# Patient Record
Sex: MALE | Race: BLACK OR AFRICAN AMERICAN | NOT HISPANIC OR LATINO
[De-identification: names, ages, dates, MRNs, and addresses within clinical notes are randomized per-mention and may not be internally consistent; named-entity substitution may affect disease eponyms.]

---

## 2020-09-22 ENCOUNTER — APPOINTMENT (OUTPATIENT)
Dept: COLORECTAL SURGERY | Facility: CLINIC | Age: 50
End: 2020-09-22

## 2021-04-02 ENCOUNTER — INPATIENT (INPATIENT)
Facility: HOSPITAL | Age: 51
LOS: 1 days | Discharge: HOME | DRG: 309 | End: 2021-04-04
Attending: STUDENT IN AN ORGANIZED HEALTH CARE EDUCATION/TRAINING PROGRAM | Admitting: STUDENT IN AN ORGANIZED HEALTH CARE EDUCATION/TRAINING PROGRAM
Payer: COMMERCIAL

## 2021-04-02 VITALS
HEIGHT: 69 IN | OXYGEN SATURATION: 100 % | SYSTOLIC BLOOD PRESSURE: 139 MMHG | WEIGHT: 205.03 LBS | TEMPERATURE: 98 F | RESPIRATION RATE: 18 BRPM | DIASTOLIC BLOOD PRESSURE: 89 MMHG | HEART RATE: 85 BPM

## 2021-04-02 DIAGNOSIS — Z93.3 COLOSTOMY STATUS: Chronic | ICD-10-CM

## 2021-04-02 DIAGNOSIS — Z98.890 OTHER SPECIFIED POSTPROCEDURAL STATES: Chronic | ICD-10-CM

## 2021-04-02 DIAGNOSIS — I48.91 UNSPECIFIED ATRIAL FIBRILLATION: ICD-10-CM

## 2021-04-02 LAB
ALBUMIN SERPL ELPH-MCNC: 3.6 G/DL — SIGNIFICANT CHANGE UP (ref 3.3–5)
ALP SERPL-CCNC: 99 U/L — SIGNIFICANT CHANGE UP (ref 40–120)
ALT FLD-CCNC: 68 U/L — SIGNIFICANT CHANGE UP (ref 12–78)
ANION GAP SERPL CALC-SCNC: 4 MMOL/L — LOW (ref 5–17)
APTT BLD: 32.7 SEC — SIGNIFICANT CHANGE UP (ref 27.5–35.5)
AST SERPL-CCNC: 37 U/L — SIGNIFICANT CHANGE UP (ref 15–37)
BASOPHILS # BLD AUTO: 0.04 K/UL — SIGNIFICANT CHANGE UP (ref 0–0.2)
BASOPHILS NFR BLD AUTO: 0.7 % — SIGNIFICANT CHANGE UP (ref 0–2)
BILIRUB SERPL-MCNC: 0.8 MG/DL — SIGNIFICANT CHANGE UP (ref 0.2–1.2)
BUN SERPL-MCNC: 14 MG/DL — SIGNIFICANT CHANGE UP (ref 7–23)
CALCIUM SERPL-MCNC: 8.7 MG/DL — SIGNIFICANT CHANGE UP (ref 8.5–10.1)
CHLORIDE SERPL-SCNC: 112 MMOL/L — HIGH (ref 96–108)
CO2 SERPL-SCNC: 26 MMOL/L — SIGNIFICANT CHANGE UP (ref 22–31)
CREAT SERPL-MCNC: 1.19 MG/DL — SIGNIFICANT CHANGE UP (ref 0.5–1.3)
EOSINOPHIL # BLD AUTO: 0.19 K/UL — SIGNIFICANT CHANGE UP (ref 0–0.5)
EOSINOPHIL NFR BLD AUTO: 3.1 % — SIGNIFICANT CHANGE UP (ref 0–6)
GLUCOSE SERPL-MCNC: 106 MG/DL — HIGH (ref 70–99)
HCT VFR BLD CALC: 54.1 % — HIGH (ref 39–50)
HGB BLD-MCNC: 17.8 G/DL — HIGH (ref 13–17)
IMM GRANULOCYTES NFR BLD AUTO: 0.3 % — SIGNIFICANT CHANGE UP (ref 0–1.5)
INR BLD: 1.01 RATIO — SIGNIFICANT CHANGE UP (ref 0.88–1.16)
LYMPHOCYTES # BLD AUTO: 2 K/UL — SIGNIFICANT CHANGE UP (ref 1–3.3)
LYMPHOCYTES # BLD AUTO: 33.1 % — SIGNIFICANT CHANGE UP (ref 13–44)
MAGNESIUM SERPL-MCNC: 2.1 MG/DL — SIGNIFICANT CHANGE UP (ref 1.6–2.6)
MCHC RBC-ENTMCNC: 29.6 PG — SIGNIFICANT CHANGE UP (ref 27–34)
MCHC RBC-ENTMCNC: 32.9 GM/DL — SIGNIFICANT CHANGE UP (ref 32–36)
MCV RBC AUTO: 90 FL — SIGNIFICANT CHANGE UP (ref 80–100)
MONOCYTES # BLD AUTO: 0.45 K/UL — SIGNIFICANT CHANGE UP (ref 0–0.9)
MONOCYTES NFR BLD AUTO: 7.5 % — SIGNIFICANT CHANGE UP (ref 2–14)
NEUTROPHILS # BLD AUTO: 3.34 K/UL — SIGNIFICANT CHANGE UP (ref 1.8–7.4)
NEUTROPHILS NFR BLD AUTO: 55.3 % — SIGNIFICANT CHANGE UP (ref 43–77)
NT-PROBNP SERPL-SCNC: 871 PG/ML — HIGH (ref 0–125)
PLATELET # BLD AUTO: 305 K/UL — SIGNIFICANT CHANGE UP (ref 150–400)
POTASSIUM SERPL-MCNC: 3.8 MMOL/L — SIGNIFICANT CHANGE UP (ref 3.5–5.3)
POTASSIUM SERPL-SCNC: 3.8 MMOL/L — SIGNIFICANT CHANGE UP (ref 3.5–5.3)
PROT SERPL-MCNC: 7 GM/DL — SIGNIFICANT CHANGE UP (ref 6–8.3)
PROTHROM AB SERPL-ACNC: 11.7 SEC — SIGNIFICANT CHANGE UP (ref 10.6–13.6)
RBC # BLD: 6.01 M/UL — HIGH (ref 4.2–5.8)
RBC # FLD: 13.6 % — SIGNIFICANT CHANGE UP (ref 10.3–14.5)
SARS-COV-2 RNA SPEC QL NAA+PROBE: SIGNIFICANT CHANGE UP
SODIUM SERPL-SCNC: 142 MMOL/L — SIGNIFICANT CHANGE UP (ref 135–145)
TROPONIN I SERPL-MCNC: <0.015 NG/ML — SIGNIFICANT CHANGE UP (ref 0.01–0.04)
TROPONIN I SERPL-MCNC: <0.015 NG/ML — SIGNIFICANT CHANGE UP (ref 0.01–0.04)
WBC # BLD: 6.04 K/UL — SIGNIFICANT CHANGE UP (ref 3.8–10.5)
WBC # FLD AUTO: 6.04 K/UL — SIGNIFICANT CHANGE UP (ref 3.8–10.5)

## 2021-04-02 PROCEDURE — 93306 TTE W/DOPPLER COMPLETE: CPT

## 2021-04-02 PROCEDURE — 93005 ELECTROCARDIOGRAM TRACING: CPT

## 2021-04-02 PROCEDURE — 80053 COMPREHEN METABOLIC PANEL: CPT

## 2021-04-02 PROCEDURE — 84484 ASSAY OF TROPONIN QUANT: CPT

## 2021-04-02 PROCEDURE — 82550 ASSAY OF CK (CPK): CPT

## 2021-04-02 PROCEDURE — 93010 ELECTROCARDIOGRAM REPORT: CPT | Mod: 76

## 2021-04-02 PROCEDURE — 80048 BASIC METABOLIC PNL TOTAL CA: CPT

## 2021-04-02 PROCEDURE — 36415 COLL VENOUS BLD VENIPUNCTURE: CPT

## 2021-04-02 PROCEDURE — 99223 1ST HOSP IP/OBS HIGH 75: CPT | Mod: AI

## 2021-04-02 PROCEDURE — 86769 SARS-COV-2 COVID-19 ANTIBODY: CPT

## 2021-04-02 PROCEDURE — 85027 COMPLETE CBC AUTOMATED: CPT

## 2021-04-02 PROCEDURE — 99291 CRITICAL CARE FIRST HOUR: CPT

## 2021-04-02 PROCEDURE — 84100 ASSAY OF PHOSPHORUS: CPT

## 2021-04-02 PROCEDURE — 83735 ASSAY OF MAGNESIUM: CPT

## 2021-04-02 PROCEDURE — 81001 URINALYSIS AUTO W/SCOPE: CPT

## 2021-04-02 PROCEDURE — 71045 X-RAY EXAM CHEST 1 VIEW: CPT | Mod: 26

## 2021-04-02 RX ORDER — FOLIC ACID 0.8 MG
1 TABLET ORAL DAILY
Refills: 0 | Status: DISCONTINUED | OUTPATIENT
Start: 2021-04-02 | End: 2021-04-04

## 2021-04-02 RX ORDER — DILTIAZEM HCL 120 MG
10 CAPSULE, EXT RELEASE 24 HR ORAL ONCE
Refills: 0 | Status: COMPLETED | OUTPATIENT
Start: 2021-04-02 | End: 2021-04-02

## 2021-04-02 RX ORDER — ASPIRIN/CALCIUM CARB/MAGNESIUM 324 MG
81 TABLET ORAL DAILY
Refills: 0 | Status: DISCONTINUED | OUTPATIENT
Start: 2021-04-02 | End: 2021-04-04

## 2021-04-02 RX ORDER — SODIUM CHLORIDE 9 MG/ML
1000 INJECTION INTRAMUSCULAR; INTRAVENOUS; SUBCUTANEOUS
Refills: 0 | Status: DISCONTINUED | OUTPATIENT
Start: 2021-04-02 | End: 2021-04-03

## 2021-04-02 RX ORDER — DILTIAZEM HCL 120 MG
60 CAPSULE, EXT RELEASE 24 HR ORAL
Refills: 0 | Status: DISCONTINUED | OUTPATIENT
Start: 2021-04-02 | End: 2021-04-03

## 2021-04-02 RX ORDER — VITAMIN E 100 UNIT
1 CAPSULE ORAL
Qty: 0 | Refills: 0 | DISCHARGE

## 2021-04-02 RX ORDER — DILTIAZEM HCL 120 MG
90 CAPSULE, EXT RELEASE 24 HR ORAL ONCE
Refills: 0 | Status: COMPLETED | OUTPATIENT
Start: 2021-04-02 | End: 2021-04-02

## 2021-04-02 RX ORDER — THIAMINE MONONITRATE (VIT B1) 100 MG
100 TABLET ORAL DAILY
Refills: 0 | Status: DISCONTINUED | OUTPATIENT
Start: 2021-04-02 | End: 2021-04-04

## 2021-04-02 RX ORDER — ACETAMINOPHEN 500 MG
650 TABLET ORAL EVERY 6 HOURS
Refills: 0 | Status: DISCONTINUED | OUTPATIENT
Start: 2021-04-02 | End: 2021-04-04

## 2021-04-02 RX ORDER — SODIUM CHLORIDE 9 MG/ML
2000 INJECTION INTRAMUSCULAR; INTRAVENOUS; SUBCUTANEOUS ONCE
Refills: 0 | Status: COMPLETED | OUTPATIENT
Start: 2021-04-02 | End: 2021-04-02

## 2021-04-02 RX ORDER — ASPIRIN/CALCIUM CARB/MAGNESIUM 324 MG
325 TABLET ORAL ONCE
Refills: 0 | Status: COMPLETED | OUTPATIENT
Start: 2021-04-02 | End: 2021-04-02

## 2021-04-02 RX ORDER — ASCORBIC ACID 60 MG
1 TABLET,CHEWABLE ORAL
Qty: 0 | Refills: 0 | DISCHARGE

## 2021-04-02 RX ADMIN — SODIUM CHLORIDE 125 MILLILITER(S): 9 INJECTION INTRAMUSCULAR; INTRAVENOUS; SUBCUTANEOUS at 17:28

## 2021-04-02 RX ADMIN — Medication 90 MILLIGRAM(S): at 14:40

## 2021-04-02 RX ADMIN — Medication 325 MILLIGRAM(S): at 17:29

## 2021-04-02 RX ADMIN — Medication 10 MILLIGRAM(S): at 14:20

## 2021-04-02 RX ADMIN — SODIUM CHLORIDE 2000 MILLILITER(S): 9 INJECTION INTRAMUSCULAR; INTRAVENOUS; SUBCUTANEOUS at 14:19

## 2021-04-02 RX ADMIN — Medication 60 MILLIGRAM(S): at 21:23

## 2021-04-02 RX ADMIN — Medication 10 MILLIGRAM(S): at 15:20

## 2021-04-02 NOTE — H&P ADULT - NSICDXPASTMEDICALHX_GEN_ALL_CORE_FT
PAST MEDICAL HISTORY:  Atrial fibrillation      PAST MEDICAL HISTORY:  Atrial fibrillation on aspirin

## 2021-04-02 NOTE — ED PROVIDER NOTE - CLINICAL SUMMARY MEDICAL DECISION MAKING FREE TEXT BOX
Dyspnea upon exertion, with acute on chronic afib with RVR. Will rate control, and reassess for disposition. Dyspnea upon exertion, with acute on chronic afib with RVR intermittent pvc vs vtach for 4/5 beats. Will rate control, and admit. also with mild rhabdomyolysis from possibly weight lifting

## 2021-04-02 NOTE — H&P ADULT - NSHPREVIEWOFSYSTEMS_GEN_ALL_CORE
Gen: + malaise, fatigue. Negative for fevers, chills, weight loss, or weight gain  Eyes: no blurred vision or lacrimation  ENT: no tinnitus, vertigo, or decreased hearing  Resp: no wheezing, dyspnea, pleuritic chest pain, or hemoptysis  CV: + HERNANDEZ, palpitations. No chest pain  GI: no nausea, vomiting, abdominal pain, diarrhea, constipation, melena, or hematochezia  : no dysuria, hematuria, or incontinence  MSK: no arthralgias, joint swelling, or myalgias  Neuro: + dizziness, weakness. No focal deficits, confusion, tremors, or seizures  Skin: no rash, lesions, or edema Gen: + malaise, fatigue. Negative for fevers, chills, weight loss, or weight gain  Eyes: no blurred vision or lacrimation  ENT: no tinnitus, vertigo, or decreased hearing  Resp: no wheezing, dyspnea, pleuritic chest pain, or hemoptysis  CV: + HERNANDEZ, palpitations. No chest pain  GI: no nausea, vomiting, abdominal pain, diarrhea, constipation, melena, or hematochezia  : no dysuria, hematuria, or incontinence  MSK: + myalgias. No arthralgias or joint swelling  Neuro: + dizziness, weakness. No focal deficits, confusion, tremors, or seizures  Skin: no rash, lesions, or edema

## 2021-04-02 NOTE — H&P ADULT - NSHPSOCIALHISTORY_GEN_ALL_CORE
No smoking history or drug use history.  Drinks alcohol, usually about 2-4 shots per night plus another drink (beer or liquor) and a glass of wine.  Works as a liquor distributor.  Lives with his wife.

## 2021-04-02 NOTE — PATIENT PROFILE ADULT - NS PRO AD PATIENT TYPE
Health Care Proxy (HCP) Pt would like to add secondary HCP: Iselashawna Matthews  [phone] (wife)/Health Care Proxy (HCP)

## 2021-04-02 NOTE — ED ADULT NURSE NOTE - OBJECTIVE STATEMENT
Patient having palpitations.  Has had hx of this before where he needed an ablation. HR in the 160's a-fib on arrival to ED.

## 2021-04-02 NOTE — H&P ADULT - NSHPLABSRESULTS_GEN_ALL_CORE
Labs personally reviewed and interpreted. Notable for no leukocytosis (WBC 6.04), left shift, or lymphopenia. Hb slightly elevated at 17.8, plt 305, INR 1.01, Na 142, K 3.8, Cl 112, HCO3 26, BUN/creatinine 14/1.19 (wnl), , calcium 8.7 with albumin 3.6, Mg 2.1, Phos 3.1, TSH wnl at 1.06, troponin negative x2, CK elevated at 1273, and proBNP elevated at 871.    CXR personally reviewed and interpreted. Notable for clear lungs, no focal consolidations, effusions, interstitial markings, pneumothorax, or obvious cardiomegaly.    EKG ________________ Labs personally reviewed and interpreted. Notable for no leukocytosis (WBC 6.04), left shift, or lymphopenia. Hb slightly elevated at 17.8, plt 305, INR 1.01, Na 142, K 3.8, Cl 112, HCO3 26, BUN/creatinine 14/1.19 (wnl), , calcium 8.7 with albumin 3.6, Mg 2.1, Phos 3.1, TSH wnl at 1.06, troponin negative x2, CK elevated at 1273, and proBNP elevated at 871.    CXR personally reviewed and interpreted. Notable for clear lungs, no focal consolidations, effusions, interstitial markings, pneumothorax, or obvious cardiomegaly.    EKG personally reviewed. Atrial fibrillation with RVR and PVCs, normal axis. TWIs in leads III, aVF, and V5-6. No prior EKG for comparison. No pathological Q waves or ST depressions/elevations. Rate 163, QTc 461. Repeat EKG similar with third EKG similar but rate controlled. Labs personally reviewed and interpreted. Notable for no leukocytosis (WBC 6.04), left shift, or lymphopenia. Hb slightly elevated at 17.8, plt 305, INR 1.01, Na 142, K 3.8, Cl 112, HCO3 26, BUN/creatinine 14/1.19 (wnl), , calcium 8.7 with albumin 3.6, Mg 2.1, Phos 3.1, TSH wnl at 1.06, troponin negative x2, CK elevated at 1273, and proBNP elevated at 871.  COVID-19 PCR result pending.  UA pending.    CXR personally reviewed and interpreted. Notable for clear lungs, no focal consolidations, effusions, interstitial markings, pneumothorax, or obvious cardiomegaly.    EKG personally reviewed. Atrial fibrillation with RVR and PVCs, normal axis. TWIs in leads III, aVF, and V5-6. No prior EKG for comparison. No pathological Q waves or ST depressions/elevations. Rate 163, QTc 461. Repeat EKG similar with third EKG similar but rate controlled.

## 2021-04-02 NOTE — ED PROVIDER NOTE - NS ED ROS FT
Review of Systems:  	•	CONSTITUTIONAL: no fever  	•	SKIN: no rash  	•	RESPIRATORY: +dyspnea upon exertion  	•	CARDIAC: no chest pain  	•	GI:  no abd pain, no nausea, no vomiting, no diarrhea  	•	GENITO-URINARY:  no dysuria  	•	MUSCULOSKELETAL:  no back pain  	•	NEUROLOGIC: no weakness  	•	ALLERGY: no rhinorrhea  	•	PSYSCHIATRIC: appropriate concern about symptoms

## 2021-04-02 NOTE — ED PROVIDER NOTE - PHYSICAL EXAMINATION
*GEN: No acute distress, well appearing   *HEAD: Normocephalic, Atraumatic  *EYES/NOSE: b/l Pupils symmetric & Reactive to light, EOMI b/l  *THROAT: airway patent, moist mucous membranes  *NECK: Neck supple  *PULMONARY: No Respiratory distress, symmetric b/l chest rise  *CARDIAC: s1s2, +irregular fast pulse  *ABDOMEN:  Non Tender, Non Distended, soft, no guarding, no rebound, no masses   *BACK: no CVA tenderness, No midline vertebral tenderness to palpation   *EXTREMITIES: symmetric pulses, 2+ DP & radial pulses, no cyanosis, no edema   *SKIN: no rash, no bruising   *NEUROLOGIC: alert,  full active & passive ROM in all 4 extremities,   *PSYCH: appropriate concern about symptoms, pleasant

## 2021-04-02 NOTE — H&P ADULT - HISTORY OF PRESENT ILLNESS
52 yo M with a PMH atrial fibrillation (on ASA) s/p past cardioversion who presents with SOB.    In the ED, he was given Diltiazem 90 mg PO x1 and 10 mg IV x2, aspirin 325 mg PO x1, and NS 2L x1 and started on NS at 125 ml/hr. 52 yo M with a PMH atrial fibrillation (on ASA) s/p past ablation who presents with palpitations. The symptoms began yesterday morning, lasting about 45 minutes at that time, and have been intermittent but often since then. He also notes lightheadedness and fatigue, particularly on exertion. He denies CP, SOB, cough, wheezing, fevers, chills, headache, blurry vision, nausea, vomiting, abdominal pain, diarrhea, dysuria, rash, or swelling. He denies sick contacts or recent travel. He is a heavy alcohol user and usually drinks about 2-4 shots per night with a beer and glass of wine, but has stopped for about a week. He was diagnosed with atrial fibrillation about 2-3 years ago and last had similar symptoms about 1.5-2 years ago (he thinks he may have stopped alcohol for a brief period at that time as well). His last cardiology visit was a month ago and thinks he has not had a TTE in several years. He is only on a baby aspirin for medication.    In the ED, he was given Diltiazem 90 mg PO x1 and 10 mg IV x2, aspirin 325 mg PO x1, and NS 2L x1 and started on NS at 125 ml/hr. 52 yo M with a PMH atrial fibrillation (on ASA) s/p past ablation who presents with palpitations. The symptoms began yesterday morning, lasting about 45 minutes at that time, and have been intermittent but often since then. He also notes lightheadedness and fatigue, particularly on exertion. He denies CP, SOB, cough, wheezing, fevers, chills, headache, blurry vision, nausea, vomiting, abdominal pain, diarrhea, dysuria, rash, or swelling. He denies sick contacts or recent travel. He is a heavy alcohol user and usually drinks about 2-4 shots per night with another drink and glass of wine, but has stopped for about a week. He was diagnosed with atrial fibrillation about 2-3 years ago and last had similar symptoms about 1.5-2 years ago (he thinks he may have stopped alcohol for a brief period at that time as well). His last cardiology visit was a month ago and thinks he has not had a TTE in several years. He is only on a baby aspirin for medication.  He does work out three times a week, last worked out 2 days ago. He does both lifting and cardio and he says he usually overworks himself and gets myalgias, but is unsure if it is worse recently.    In the ED, he was given Diltiazem 90 mg PO x1 and 10 mg IV x2, aspirin 325 mg PO x1, and NS 2L x1 and started on NS at 125 ml/hr.

## 2021-04-02 NOTE — ED ADULT NURSE REASSESSMENT NOTE - NS ED NURSE REASSESS COMMENT FT1
HR fluctuating between 90's-130's, spoke with MD Sandoval at bedside- since patient is asymptomatic to give the midnight dose of PO cardizem now.  Will medicate and send to N.

## 2021-04-02 NOTE — ED PROVIDER NOTE - OBJECTIVE STATEMENT
Pertinent HPI/PMH/PSH/FHx/SHx and Review of Systems contained within  HPI:  50 y/o male presenting with Dyspnea upon Exertion. Pt states symptoms onset yesterday morning. Pt reports SOB aggravated with exertion, states he has little difficulty breathing at rest.  PMH/PSH relevant for: Afib on 81 ASA. Pt reports history of smoking, denies other drug use.  ROS negative for: fever, Chest pain, Nausea, vomiting, diarrhea, abdominal pain, dysuria    FamilyHx and SocialHx not otherwise contributory  CARDIO: Theron Law [phone]. Pt notes he does not presently followup with cardio. Pertinent HPI/PMH/PSH/FHx/SHx and Review of Systems contained within  HPI:  50 y/o male presenting with Dyspnea upon Exertion x1d, new onset. Pt states symptoms onset yesterday morning. Pt reports SOB aggravated with exertion, states he has no difficulty breathing at rest.  PMH/PSH relevant for: Afib on 81 ASA only  ROS negative for: fever, Chest pain, Nausea, vomiting, diarrhea, abdominal pain, dysuria    FamilyHx not contributory  SocialHx: former smoker, denies other drug use.  CARDIO: Theron Law [phone]. Pt notes he does not presently followup with cardio anymore
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
## 2021-04-02 NOTE — H&P ADULT - NSICDXFAMILYHX_GEN_ALL_CORE_FT
FAMILY HISTORY:  FH: diabetes mellitus  FH: HTN (hypertension)    Father  Still living? No  Family history of lung cancer, Age at diagnosis: Age Unknown    Mother  Still living? Yes, Estimated age: Age Unknown  Family history of lung cancer, Age at diagnosis: Age Unknown    Sibling  Still living? Unknown  Family history of atrial fibrillation, Age at diagnosis: Age Unknown

## 2021-04-02 NOTE — ED ADULT NURSE NOTE - NSFALLRSKOUTCOME_ED_ALL_ED
and oriented to person, place, and time. Skin: Skin is warm and dry. He is not diaphoretic. Psychiatric: He has a normal mood and affect. His behavior is normal. Judgment and thought content normal.   Nursing note and vitals reviewed. A1C ordered today, done 6.8  New onset diabetes  Referral to dietician offered. Discussed diet/exercise/metformin & side effects   Diagnosis Orders   1. New onset type 2 diabetes mellitus (HCC)  POCT glycosylated hemoglobin (Hb A1C)         New onset type 2 diabetes mellitus (HCC)  -     POCT glycosylated hemoglobin (Hb A1C)  -     Diabetic [facility]    Other orders  -     metFORMIN (GLUCOPHAGE) 500 MG tablet; Take 1 tablet by mouth Daily with supper      50% of time discussing diagnosis, prognosis, risk and benefits of treatment, treatment plan, side effects, and answered questions. An electronic signature was used to authenticate this note. This was dictated. Errors mightbe possible due to dictation.   --Lamar Silva, DO [employer]

## 2021-04-02 NOTE — H&P ADULT - NSHPPHYSICALEXAM_GEN_ALL_CORE
Vital Signs Last 24 Hrs  T(C): 36.6 (02 Apr 2021 17:28), Max: 36.6 (02 Apr 2021 13:46)  T(F): 97.8 (02 Apr 2021 17:28), Max: 97.9 (02 Apr 2021 13:46)  HR: 84 (02 Apr 2021 17:28) (84 - 163)  BP: 128/67 (02 Apr 2021 17:28) (119/65 - 139/89)  BP(mean): 103 (02 Apr 2021 13:46) (103 - 103)  RR: 16 (02 Apr 2021 17:28) (16 - 18)  SpO2: 98% (02 Apr 2021 17:28) (98% - 100%) Vital Signs Last 24 Hrs  T(C): 36.6 (02 Apr 2021 17:28), Max: 36.6 (02 Apr 2021 13:46)  T(F): 97.8 (02 Apr 2021 17:28), Max: 97.9 (02 Apr 2021 13:46)  HR: 84 (02 Apr 2021 17:28) (84 - 163)  BP: 128/67 (02 Apr 2021 17:28) (119/65 - 139/89)  BP(mean): 103 (02 Apr 2021 13:46) (103 - 103)  RR: 16 (02 Apr 2021 17:28) (16 - 18)  SpO2: 98% (02 Apr 2021 17:28) (98% - 100%)    GENERAL: No acute distress  HEENT: PERRL, EOMI, MM slightly dry, no oropharyngeal lesions  NECK: supple, no stiffness, no JVD, no thyromegaly  PULM: respirations non-labored, clear to auscultation bilaterally, no rales, rhonchi, or wheezes  CV: irregular rate and rhythm, no murmurs, gallops, or rubs  GI: abdomen soft, nontender, nondistended, no masses felt, normal bowel sounds  MSK: strength 5/5 bilateral upper/lower extremities. No joint swelling, erythema, or warmth.  LYMPH: no anterior cervical, posterior cervical, supraclavicular, or inguinal lymphadenopathy  NEURO: A&Ox3, no tremors, sensation intact  SKIN: no rashes, lesions, or edema

## 2021-04-02 NOTE — ED PROVIDER NOTE - CRITICAL CARE ATTENDING CONTRIBUTION TO CARE
Patient was critically ill with a high probability of imminent or life threatening deterioration. I have personally provided the amount of critical care time documented below excluding time spent on separate procedures.   Critical care provided: direct patient care (not related to procedure), additional history taking, interpretation of diagnostic studies, documentation, consultation with other physicians, discussion of plan with patient and family

## 2021-04-02 NOTE — ED ADULT TRIAGE NOTE - CHIEF COMPLAINT QUOTE
Pt comes to the ED complaining of dyspnea on exertion x 2days. Pts O2 sat at triage 95-96% HR 83. Pt denies chest pain,  fevers, cough, sick contacts, abdominal pain.

## 2021-04-02 NOTE — H&P ADULT - ASSESSMENT
50 yo M with a PMH atrial fibrillation (on ASA) s/p past cardioversion who presents with SOB, found to be in atrial fibrillation with RVR. 50 yo M with a PMH atrial fibrillation (on ASA) s/p past cardioversion who presents with SOB, found to be in atrial fibrillation with RVR.    1) Atrial fibrillation with RVR  - History of afib s/p ablation in past  - May be provoked by cutting back on alcohol in past week, although may have dilated cardiomyopathy in the setting of alcohol use  - Given PO/IV Diltiazem in ED with HR improvement  - Not on rate controlling meds, will start Diltiazem at 60 mg QID  - Admit to telemetry  - Check UA to r/o UTI predisposing  - Trend troponin  - Cardiology eval  - Check TTE  - [id number] of 0, can continue with only aspirin 81 mg QD    2) Rhabdomyolysis  - CK elevated   - Troponin negative x2, may be indicative of true rhabdo instead of CK-MB elevated (unable to perform CK-MB here)  - Pt endorses consistent strenuous workouts and some myalgias  - Pain control PRN  - Maintenance IVFs    3) Heavy Alcohol Consumption  - Patient drinks significant amount as noted in social history, cut back one week ago  - Discussed extensively techniques to cut back, patient has already cut back in past week. Told to use it as a springboard to continue. 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

## 2021-04-03 LAB
ANION GAP SERPL CALC-SCNC: 6 MMOL/L — SIGNIFICANT CHANGE UP (ref 5–17)
APPEARANCE UR: CLEAR — SIGNIFICANT CHANGE UP
BILIRUB UR-MCNC: NEGATIVE — SIGNIFICANT CHANGE UP
BUN SERPL-MCNC: 10 MG/DL — SIGNIFICANT CHANGE UP (ref 7–23)
CALCIUM SERPL-MCNC: 8.2 MG/DL — LOW (ref 8.5–10.1)
CHLORIDE SERPL-SCNC: 115 MMOL/L — HIGH (ref 96–108)
CO2 SERPL-SCNC: 22 MMOL/L — SIGNIFICANT CHANGE UP (ref 22–31)
COLOR SPEC: YELLOW — SIGNIFICANT CHANGE UP
COVID-19 SPIKE DOMAIN AB INTERP: NEGATIVE — SIGNIFICANT CHANGE UP
COVID-19 SPIKE DOMAIN ANTIBODY RESULT: 0.4 U/ML — SIGNIFICANT CHANGE UP
CREAT SERPL-MCNC: 0.87 MG/DL — SIGNIFICANT CHANGE UP (ref 0.5–1.3)
DIFF PNL FLD: NEGATIVE — SIGNIFICANT CHANGE UP
GLUCOSE SERPL-MCNC: 95 MG/DL — SIGNIFICANT CHANGE UP (ref 70–99)
GLUCOSE UR QL: NEGATIVE MG/DL — SIGNIFICANT CHANGE UP
HCT VFR BLD CALC: 51.2 % — HIGH (ref 39–50)
HGB BLD-MCNC: 16.5 G/DL — SIGNIFICANT CHANGE UP (ref 13–17)
KETONES UR-MCNC: NEGATIVE — SIGNIFICANT CHANGE UP
LEUKOCYTE ESTERASE UR-ACNC: ABNORMAL
MAGNESIUM SERPL-MCNC: 2.1 MG/DL — SIGNIFICANT CHANGE UP (ref 1.6–2.6)
MCHC RBC-ENTMCNC: 29.2 PG — SIGNIFICANT CHANGE UP (ref 27–34)
MCHC RBC-ENTMCNC: 32.2 GM/DL — SIGNIFICANT CHANGE UP (ref 32–36)
MCV RBC AUTO: 90.5 FL — SIGNIFICANT CHANGE UP (ref 80–100)
NITRITE UR-MCNC: NEGATIVE — SIGNIFICANT CHANGE UP
PH UR: 7 — SIGNIFICANT CHANGE UP (ref 5–8)
PHOSPHATE SERPL-MCNC: 2.8 MG/DL — SIGNIFICANT CHANGE UP (ref 2.5–4.5)
PLATELET # BLD AUTO: 212 K/UL — SIGNIFICANT CHANGE UP (ref 150–400)
POTASSIUM SERPL-MCNC: 4.3 MMOL/L — SIGNIFICANT CHANGE UP (ref 3.5–5.3)
POTASSIUM SERPL-SCNC: 4.3 MMOL/L — SIGNIFICANT CHANGE UP (ref 3.5–5.3)
PROT UR-MCNC: NEGATIVE MG/DL — SIGNIFICANT CHANGE UP
RBC # BLD: 5.66 M/UL — SIGNIFICANT CHANGE UP (ref 4.2–5.8)
RBC # FLD: 14 % — SIGNIFICANT CHANGE UP (ref 10.3–14.5)
SARS-COV-2 IGG+IGM SERPL QL IA: 0.4 U/ML — SIGNIFICANT CHANGE UP
SARS-COV-2 IGG+IGM SERPL QL IA: NEGATIVE — SIGNIFICANT CHANGE UP
SODIUM SERPL-SCNC: 143 MMOL/L — SIGNIFICANT CHANGE UP (ref 135–145)
SP GR SPEC: 1 — LOW (ref 1.01–1.02)
TROPONIN I SERPL-MCNC: <0.015 NG/ML — SIGNIFICANT CHANGE UP (ref 0.01–0.04)
UROBILINOGEN FLD QL: NEGATIVE MG/DL — SIGNIFICANT CHANGE UP
WBC # BLD: 4.61 K/UL — SIGNIFICANT CHANGE UP (ref 3.8–10.5)
WBC # FLD AUTO: 4.61 K/UL — SIGNIFICANT CHANGE UP (ref 3.8–10.5)

## 2021-04-03 PROCEDURE — 99233 SBSQ HOSP IP/OBS HIGH 50: CPT | Mod: GC

## 2021-04-03 PROCEDURE — 93306 TTE W/DOPPLER COMPLETE: CPT | Mod: 26

## 2021-04-03 PROCEDURE — 93010 ELECTROCARDIOGRAM REPORT: CPT

## 2021-04-03 RX ORDER — SODIUM CHLORIDE 9 MG/ML
1000 INJECTION INTRAMUSCULAR; INTRAVENOUS; SUBCUTANEOUS
Refills: 0 | Status: DISCONTINUED | OUTPATIENT
Start: 2021-04-03 | End: 2021-04-04

## 2021-04-03 RX ORDER — DILTIAZEM HCL 120 MG
5 CAPSULE, EXT RELEASE 24 HR ORAL ONCE
Refills: 0 | Status: COMPLETED | OUTPATIENT
Start: 2021-04-03 | End: 2021-04-03

## 2021-04-03 RX ORDER — DILTIAZEM HCL 120 MG
90 CAPSULE, EXT RELEASE 24 HR ORAL EVERY 6 HOURS
Refills: 0 | Status: DISCONTINUED | OUTPATIENT
Start: 2021-04-03 | End: 2021-04-04

## 2021-04-03 RX ADMIN — Medication 1 TABLET(S): at 10:01

## 2021-04-03 RX ADMIN — Medication 5 MILLIGRAM(S): at 13:24

## 2021-04-03 RX ADMIN — Medication 90 MILLIGRAM(S): at 23:04

## 2021-04-03 RX ADMIN — Medication 81 MILLIGRAM(S): at 10:01

## 2021-04-03 RX ADMIN — SODIUM CHLORIDE 75 MILLILITER(S): 9 INJECTION INTRAMUSCULAR; INTRAVENOUS; SUBCUTANEOUS at 22:52

## 2021-04-03 RX ADMIN — Medication 60 MILLIGRAM(S): at 05:19

## 2021-04-03 RX ADMIN — Medication 90 MILLIGRAM(S): at 15:51

## 2021-04-03 RX ADMIN — Medication 60 MILLIGRAM(S): at 11:14

## 2021-04-03 RX ADMIN — Medication 100 MILLIGRAM(S): at 10:02

## 2021-04-03 RX ADMIN — Medication 1 MILLIGRAM(S): at 10:01

## 2021-04-03 NOTE — CONSULT NOTE ADULT - TIME-BASED
What Type Of Note Output Would You Prefer (Optional)?: Standard Output How Severe Are Your Spot(S)?: moderate Have Your Spot(S) Been Treated In The Past?: has not been treated Hpi Title: Evaluation of a Skin Lesion Family Member: father Year Removed: 1902 43

## 2021-04-03 NOTE — CONSULT NOTE ADULT - ASSESSMENT
52 yo M with a PMH atrial fibrillation (on ASA) s/p past ablation and prior LOLA/CV who presents with palpitations found to be in AFib RVR. TTE showing mildly reduced LVSF (likely tachycardia induced) with ?artifact vs echo dense structure in RA.    -Afib RVR likely 2/2 stopping etoh, remains uncontrolled.  -Will increase cardizem to 90mg Q6 hr. Can consider starting a cardizem gtt as pills are working and titrate off with goal HR <110. If not improved consider starting metoprolol tartrate. Avoid Digoxin since it will promote AFib and prevent him from getting out of this rhythm.  -Although CHADSVASc is 0 would avoid cardioversion without a LOLA since he still can have developed a thrombus in BIANCA and could cause a stroke and also has this ?echodensity on tte  -Will consider LOLA/CV on Monday if he is unable to get controlled.  -C/W ASA.

## 2021-04-03 NOTE — PROGRESS NOTE ADULT - ASSESSMENT
52 yo M with a PMH atrial fibrillation (on ASA) s/p past cardioversion who presents with SOB, found to be in atrial fibrillation with RVR.    #Atrial fibrillation with RVR  - History of afib s/p ablation in past ( 2-3 years ago)   - May be provoked by cutting back on alcohol in past week  - In no rate control medications at home   - Given PO/IV Diltiazem in ED with HR improvement and started on Diltiazem 60 mg PO four times daily   - Diltiazem increased to 90 mg PO every 6 hrs   - If not improvement consider start Metoprolol tartrate or Cardizem drip   - ECHO showed LVEF 40% and left ventricle systolic function impaired   - Trop neg x 3   - [id number] of 0  - Continue ASA   - Possible LOLA/ CV on Monday if continues in Afib RVR   - Cardiology recommendations appreciated     #HFrEF   - ECHO showed LVEF 40% and left ventricle systolic function impaired and echodense structure is noted in the right atrium   - Could be secondary to prolong uncontrolled Afib and concomitant alcoholic cardiomyopathy??   - Euvolemic   - Pt with a echodense structure in the RA?? could benefit of LOLA to r/o myxoma vs anatomic variant  vs thrombus  - Consider initiation of BB, discuss with cardiology   - Follow up cardiology recommendations      # Rhabdomyolysis  - CK elevated at admission  1273   - Pt endorses consistent strenuous workouts and some myalgias since one week ago   - Renal function stable   - Trend CK   - Continue maintenance IVFs    # Heavy Alcohol Consumption  - Patient drinks significant amount as noted in social history, cut back one week ago  - Discussed extensively techniques to cut back, patient has already cut back in past week. 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    #Atrial fibrillation with RVR  - History of afib s/p ablation in past ( 2-3 years ago)   - May be provoked by cutting back on alcohol in past week  - In no rate control medications at home   - Given PO/IV Diltiazem in ED with HR improvement and started on Diltiazem 60 mg PO four times daily   - Diltiazem increased to 90 mg PO every 6 hrs   - If not improvement consider start Metoprolol tartrate or Cardizem drip   - ECHO showed LVEF 40% and left ventricle systolic function impaired   - Trop neg x 3   - [id number] of 0  - Continue ASA   - Possible LOLA/ CV on Monday if continues in Afib RVR   - Cardiology recommendations appreciated     #Cardiomyopathy   - ECHO showed LVEF 40% and left ventricle systolic function impaired and echodense structure is noted in the right atrium   - Could be secondary to prolong uncontrolled Afib( tachycardiac driven)   - Euvolemic   - Pt with a echodense structure in the RA?? could benefit of LOLA to r/o myxoma vs anatomic variant  vs thrombus  - Consider initiation of BB, discuss with cardiology   - Follow up cardiology recommendations      # Rhabdomyolysis  - CK elevated at admission  1273   - Pt endorses consistent strenuous workouts and some myalgias since one week ago   - Renal function stable   - Trend CK   - Continue maintenance IVFs    # Heavy Alcohol Consumption  - Patient drinks significant amount as noted in social history, cut back one week ago  - Discussed extensively techniques to cut back, patient has already cut back in past week. 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    #Atrial fibrillation with RVR  - History of afib s/p ablation in past ( 2-3 years ago)   - May be provoked by cutting back on alcohol in past week  - In no rate control medications at home   - Given PO/IV Diltiazem in ED with HR improvement and started on Diltiazem 60 mg PO four times daily   - Diltiazem increased to 90 mg PO every 6 hrs   - If not improvement consider start Metoprolol tartrate or Cardizem drip   - ECHO showed LVEF 40% and left ventricle systolic function impaired   - Trop neg x 3   - [id number] of 0  - Continue ASA   - Possible LOLA/ CV on Monday if continues in Afib RVR   - Cardiology recommendations appreciated     #Cardiomyopathy   - ECHO showed LVEF 40% and left ventricle systolic function impaired and echodense structure is noted in the right atrium   - Could be secondary to prolong uncontrolled Afib( tachycardiac driven)   - Euvolemic   - Pt with a echodense structure in the RA?? could benefit of LOLA to r/o myxoma vs anatomic variant  vs thrombus  - Consider initiation of BB, discuss with cardiology   - Follow up cardiology recommendations      # Rhabdomyolysis  - CK elevated at admission  1273   - Pt endorses consistent strenuous workouts and some myalgias since one week ago   - Renal function stable   - Trend CK   - Continue IVF     # Heavy Alcohol Consumption  - Patient drinks significant amount as noted in social history, cut back one week ago  - Discussed extensively techniques to cut back, patient has already cut back in past week. 

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
## 2021-04-04 ENCOUNTER — TRANSCRIPTION ENCOUNTER (OUTPATIENT)
Age: 51
End: 2021-04-04

## 2021-04-04 VITALS
OXYGEN SATURATION: 99 % | RESPIRATION RATE: 18 BRPM | DIASTOLIC BLOOD PRESSURE: 74 MMHG | SYSTOLIC BLOOD PRESSURE: 131 MMHG | TEMPERATURE: 98 F | HEART RATE: 81 BPM

## 2021-04-04 LAB
ALBUMIN SERPL ELPH-MCNC: 3.1 G/DL — LOW (ref 3.3–5)
ALP SERPL-CCNC: 87 U/L — SIGNIFICANT CHANGE UP (ref 40–120)
ALT FLD-CCNC: 50 U/L — SIGNIFICANT CHANGE UP (ref 12–78)
ANION GAP SERPL CALC-SCNC: 4 MMOL/L — LOW (ref 5–17)
AST SERPL-CCNC: 21 U/L — SIGNIFICANT CHANGE UP (ref 15–37)
BILIRUB SERPL-MCNC: 0.8 MG/DL — SIGNIFICANT CHANGE UP (ref 0.2–1.2)
BUN SERPL-MCNC: 11 MG/DL — SIGNIFICANT CHANGE UP (ref 7–23)
CALCIUM SERPL-MCNC: 8.3 MG/DL — LOW (ref 8.5–10.1)
CHLORIDE SERPL-SCNC: 110 MMOL/L — HIGH (ref 96–108)
CK SERPL-CCNC: 547 U/L — HIGH (ref 26–308)
CO2 SERPL-SCNC: 28 MMOL/L — SIGNIFICANT CHANGE UP (ref 22–31)
CREAT SERPL-MCNC: 0.95 MG/DL — SIGNIFICANT CHANGE UP (ref 0.5–1.3)
GLUCOSE SERPL-MCNC: 93 MG/DL — SIGNIFICANT CHANGE UP (ref 70–99)
MAGNESIUM SERPL-MCNC: 2 MG/DL — SIGNIFICANT CHANGE UP (ref 1.6–2.6)
PHOSPHATE SERPL-MCNC: 2.9 MG/DL — SIGNIFICANT CHANGE UP (ref 2.5–4.5)
POTASSIUM SERPL-MCNC: 4.1 MMOL/L — SIGNIFICANT CHANGE UP (ref 3.5–5.3)
POTASSIUM SERPL-SCNC: 4.1 MMOL/L — SIGNIFICANT CHANGE UP (ref 3.5–5.3)
PROT SERPL-MCNC: 6.5 GM/DL — SIGNIFICANT CHANGE UP (ref 6–8.3)
SODIUM SERPL-SCNC: 142 MMOL/L — SIGNIFICANT CHANGE UP (ref 135–145)

## 2021-04-04 PROCEDURE — 99239 HOSP IP/OBS DSCHRG MGMT >30: CPT | Mod: GC

## 2021-04-04 RX ORDER — FOLIC ACID 0.8 MG
1 TABLET ORAL
Qty: 30 | Refills: 0
Start: 2021-04-04 | End: 2021-05-03

## 2021-04-04 RX ORDER — DILTIAZEM HCL 120 MG
1 CAPSULE, EXT RELEASE 24 HR ORAL
Qty: 30 | Refills: 2
Start: 2021-04-04 | End: 2021-07-02

## 2021-04-04 RX ORDER — DILTIAZEM HCL 120 MG
360 CAPSULE, EXT RELEASE 24 HR ORAL DAILY
Refills: 0 | Status: DISCONTINUED | OUTPATIENT
Start: 2021-04-04 | End: 2021-04-04

## 2021-04-04 RX ORDER — DILTIAZEM HCL 120 MG
1 CAPSULE, EXT RELEASE 24 HR ORAL
Qty: 0 | Refills: 0 | DISCHARGE
Start: 2021-04-04

## 2021-04-04 RX ORDER — THIAMINE MONONITRATE (VIT B1) 100 MG
1 TABLET ORAL
Qty: 30 | Refills: 0
Start: 2021-04-04 | End: 2021-05-03

## 2021-04-04 RX ADMIN — Medication 90 MILLIGRAM(S): at 05:10

## 2021-04-04 RX ADMIN — Medication 100 MILLIGRAM(S): at 09:19

## 2021-04-04 RX ADMIN — Medication 81 MILLIGRAM(S): at 09:19

## 2021-04-04 RX ADMIN — Medication 360 MILLIGRAM(S): at 11:05

## 2021-04-04 RX ADMIN — Medication 1 TABLET(S): at 09:19

## 2021-04-04 RX ADMIN — Medication 1 MILLIGRAM(S): at 09:19

## 2021-04-04 NOTE — CHART NOTE - NSCHARTNOTEFT_GEN_A_CORE
Pt seen and examined with house staff.  Plan formulated and reviewed on rounds     Briefly,  50 y/o male with h/o AFib s/p ablation about 3 yrs ago--only on ASA 81 admitted with Rapid AFib.  Started on PO dilt 30 q6.  TTE (4/3)--EF 40%/Mod tr/echogenic in RA  He does use ETOH--last drink about 1 week ago  No events overnight   Awake and alert  NAD  ROS o/w negative     Stable vitals  No fevers  On RA    Grossly non focal   B/L air entry    Labs reviewed     UA negative  CXR--no infiltrates  Trop Negative X 3    Recurrent AFIb with RVR  ?? RA echogenic mass  Rhabomylosis     Cont with BBx and ASA  Low KHANG score  Cards eval  Vitamins   DVT prophy  Trend CPK level in AM  Observe for ETOH WD Sx    Tele
Pt seen and examined with house staff.  Plan formulated and reviewed on rounds    Briefly,  50 y/o male with h/o AFib s/p ablation about 3 yrs ago--only on ASA 81 admitted with Rapid AFib.  Started on PO dilt 30 q6.  TTE (4/3)--EF 40%/Mod tr/echogenic in RA  Converted to NSR     He does use ETOH--last drink about 1 week ago  No events overnight   Awake and alert  NAD  ROS o/w negative     Stable vitals  No fevers  On RA    Grossly non focal   B/L air entry    Labs reviewed     UA negative  CXR--no infiltrates  Trop Negative X 3    Recurrent AFIb with RVR  ?? RA echogenic mass which is an normal variant as d/w Dr DARELL Linn   Rhabdomyolysis from working out--CPK down to 500s    Cont with BBx and ASA  Low KHANG score (0)  Will plan for DC home today    Will follow up with Dr Linn as outpt

## 2021-04-04 NOTE — DISCHARGE NOTE PROVIDER - NSDCMRMEDTOKEN_GEN_ALL_CORE_FT
aspirin 81 mg oral delayed release tablet: 1 tab(s) orally once a day  Cardizem  mg/24 hours oral capsule, extended release: 1 cap(s) orally once a day  Vitamin C 500 mg oral tablet: 1 tab(s) orally once a day  vitamin E: 1 tab(s) orally once a day   aspirin 81 mg oral delayed release tablet: 1 tab(s) orally once a day  Cardizem  mg/24 hours oral capsule, extended release: 1 cap(s) orally once a day  folic acid 1 mg oral tablet: 1 tab(s) orally once a day  thiamine 100 mg oral tablet: 1 tab(s) orally once a day  Vitamin C 500 mg oral tablet: 1 tab(s) orally once a day  vitamin E: 1 tab(s) orally once a day

## 2021-04-04 NOTE — DISCHARGE NOTE PROVIDER - NSDCCAREPROVSEEN_GEN_ALL_CORE_FT
Temitope, Hetal Cancino, Hussein Marrero, Daya Sandoval, Aryles O'Neill, Paul Arciniega, Figueroa MODI

## 2021-04-04 NOTE — DISCHARGE NOTE PROVIDER - HOSPITAL COURSE
50 yo M with a PMH atrial fibrillation (on ASA) s/p past ablation who presents with palpitations. He was diagnosed with atrial fibrillation about 2-3 years ago and last had similar symptoms about 1.5-2 years ago . His last cardiology visit was a month ago and thinks he has not had a TTE in several years. He is only on a baby aspirin for medication. In the ED, Patient was found to be in AFib RVR. he was given Diltiazem 90 mg PO x1 and 10 mg IV x2, aspirin 325 mg PO x1, and NS 2L x1 and started on NS at 125 ml/hr. Patient was admitted for further management of AFIB. On the floors pt was seen by a cardiologist and a hospitalist. TTE was performed, EF 40%. Patient started on diltiazem 90 mg four times day which subsequently converted patients HR back to sinus rhythm. Patient now medically cleared to be discharged both by hospitalist and cardiologist. Will discharge patient with Cardizem  mg daily for rate control therapy and  patient to continue aspirin 81 mg for stroke prevention. Patient to follow up with cardiology, Dr. Linn, within 1 week .    Structure in RA seen on TTE was reviewed by cardiologist and  per cardio it is just a Chiari network which is normal RA structure. no further evaluation or work up needed.    Subjective:   Pt is evaluated at bedside and found NAD. No palpitations overnight. Feels at base line overall. Telemetry reviewed and patients HR overnight averaged around 80BPM . He denies chest pain, nausea, vomits and abdominal pain.       REVIEW OF SYSTEMS:  CONSTITUTIONAL: No weakness, fevers or chills  EYES/ENT: No visual changes;  No vertigo or throat pain   NECK: No pain or stiffness  RESPIRATORY: No cough, wheezing, hemoptysis; No shortness of breath  CARDIOVASCULAR: No chest pain or palpitations  GASTROINTESTINAL: No abdominal or epigastric pain. No nausea, vomiting, or hematemesis; No diarrhea or constipation. No melena or hematochezia.  GENITOURINARY: No dysuria, frequency or hematuria  NEUROLOGICAL: No numbness or weakness  SKIN: No itching, burning, rashes, or lesions   All other review of systems is negative unless indicated above    PHYSICAL EXAM:  Vital Signs Last 24 Hrs  T(C): 36.7 (04 Apr 2021 07:44), Max: 37 (03 Apr 2021 15:52)  T(F): 98 (04 Apr 2021 07:44), Max: 98.6 (03 Apr 2021 15:52)  HR: 81 (04 Apr 2021 07:44) (78 - 140)  BP: 131/74 (04 Apr 2021 07:44) (111/96 - 131/74)  RR: 18 (04 Apr 2021 07:44) (18 - 18)  SpO2: 99% (04 Apr 2021 07:44) (97% - 99%)    Constitutional: Pt lying in bed, awake and alert, NAD  HEENT: EOMI, normal hearing, moist mucous membranes  Neck: Soft and supple, no JVD  Respiratory: CTABL, No wheezing, rales or rhonchi  Cardiovascular: S1S2+, RRR, no M/G/R  Gastrointestinal: BS+, soft, NT/ND, no guarding, no rebound  Extremities: No peripheral edema  Vascular: 2+ peripheral pulses  Neurological: AAOx3, no focal deficits  Musculoskeletal: 5/5 strength b/l upper and lower extremities  Skin: No rashes    LABS: All Labs Reviewed:                        16.5   4.61  )-----------( 212      ( 03 Apr 2021 07:23 )             51.2     04-04    142  |  110<H>  |  11  ----------------------------<  93  4.1   |  28  |  0.95    Ca    8.3<L>      04 Apr 2021 07:15  Phos  2.9     04-04  Mg     2.0     04-04    TPro  6.5  /  Alb  3.1<L>  /  TBili  0.8  /  DBili  x   /  AST  21  /  ALT  50  /  AlkPhos  87  04-04    PT/INR - ( 02 Apr 2021 14:09 )   PT: 11.7 sec;   INR: 1.01 ratio         PTT - ( 02 Apr 2021 14:09 )  PTT:32.7 sec  CARDIAC MARKERS ( 04 Apr 2021 07:15 )  x     / x     / 547 U/L / x     / x      CARDIAC MARKERS ( 03 Apr 2021 07:23 )  <0.015 ng/mL / x     / x     / x     / x      CARDIAC MARKERS ( 02 Apr 2021 16:42 )  <0.015 ng/mL / x     / x     / x     / x      CARDIAC MARKERS ( 02 Apr 2021 14:09 )  <0.015 ng/mL / x     / 1273 U/L / x     / x          RADIOLOGY/EKG:    < from: Xray Chest 1 View- PORTABLE-Urgent (04.02.21 @ 14:35) >    IMPRESSION: Negative chest.     52 yo M with a PMH atrial fibrillation (on ASA) s/p past ablation who presented with palpitations. He was diagnosed with atrial fibrillation about 2-3 years ago and last had similar symptoms about 1.5-2 years ago .His last cardiology visit was a month ago and thinks he has not had a TTE in several years. He is only on a baby aspirin for medication. In the ED, Patient was found to be in AFib RVR. he was given Diltiazem 90 mg PO x1 and 10 mg IV x2, aspirin 325 mg PO x1, and NS 2L x1 and started on NS at 125 ml/hr. Patient was admitted for further management of AFIB. On the floors pt was seen by a cardiologist and a hospitalist. TTE was performed, EF 40%. Patient started on diltiazem 90 mg four times day which subsequently converted patients HR back to sinus rhythm. Patient now medically cleared to be discharged both by hospitalist and cardiologist. Will discharge patient with Cardizem  mg daily for rate control therapy and  patient to continue aspirin 81 mg for stroke prevention. Patient to follow up with cardiology, Dr. Linn, within 1 week . Patient given information for assistance in quitting alcohol consumption.     Structure in RA seen on TTE was reviewed by cardiologist and per cardio it is just a Chiari network which is normal RA structure. no further evaluation or work up needed.    Subjective:   Pt is evaluated at bedside and found NAD. No palpitations overnight. Feels at base line overall. Telemetry reviewed and patients HR overnight averaged around 80BPM . He denies chest pain, nausea, vomits and abdominal pain.       REVIEW OF SYSTEMS:  CONSTITUTIONAL: No weakness, fevers or chills  EYES/ENT: No visual changes;  No vertigo or throat pain   NECK: No pain or stiffness  RESPIRATORY: No cough, wheezing, hemoptysis; No shortness of breath  CARDIOVASCULAR: No chest pain or palpitations  GASTROINTESTINAL: No abdominal or epigastric pain. No nausea, vomiting, or hematemesis; No diarrhea or constipation. No melena or hematochezia.  GENITOURINARY: No dysuria, frequency or hematuria  NEUROLOGICAL: No numbness or weakness  SKIN: No itching, burning, rashes, or lesions   All other review of systems is negative unless indicated above    PHYSICAL EXAM:  Vital Signs Last 24 Hrs  T(C): 36.7 (04 Apr 2021 07:44), Max: 37 (03 Apr 2021 15:52)  T(F): 98 (04 Apr 2021 07:44), Max: 98.6 (03 Apr 2021 15:52)  HR: 81 (04 Apr 2021 07:44) (78 - 140)  BP: 131/74 (04 Apr 2021 07:44) (111/96 - 131/74)  RR: 18 (04 Apr 2021 07:44) (18 - 18)  SpO2: 99% (04 Apr 2021 07:44) (97% - 99%)    Constitutional: Pt lying in bed, awake and alert, NAD  HEENT: EOMI, normal hearing, moist mucous membranes  Neck: Soft and supple, no JVD  Respiratory: CTABL, No wheezing, rales or rhonchi  Cardiovascular: S1S2+, RRR, no M/G/R  Gastrointestinal: BS+, soft, NT/ND, no guarding, no rebound  Extremities: No peripheral edema  Vascular: 2+ peripheral pulses  Neurological: AAOx3, no focal deficits  Musculoskeletal: 5/5 strength b/l upper and lower extremities  Skin: No rashes    LABS: All Labs Reviewed:                        16.5   4.61  )-----------( 212      ( 03 Apr 2021 07:23 )             51.2     04-04    142  |  110<H>  |  11  ----------------------------<  93  4.1   |  28  |  0.95    Ca    8.3<L>      04 Apr 2021 07:15  Phos  2.9     04-04  Mg     2.0     04-04    TPro  6.5  /  Alb  3.1<L>  /  TBili  0.8  /  DBili  x   /  AST  21  /  ALT  50  /  AlkPhos  87  04-04    PT/INR - ( 02 Apr 2021 14:09 )   PT: 11.7 sec;   INR: 1.01 ratio         PTT - ( 02 Apr 2021 14:09 )  PTT:32.7 sec  CARDIAC MARKERS ( 04 Apr 2021 07:15 )  x     / x     / 547 U/L / x     / x      CARDIAC MARKERS ( 03 Apr 2021 07:23 )  <0.015 ng/mL / x     / x     / x     / x      CARDIAC MARKERS ( 02 Apr 2021 16:42 )  <0.015 ng/mL / x     / x     / x     / x      CARDIAC MARKERS ( 02 Apr 2021 14:09 )  <0.015 ng/mL / x     / 1273 U/L / x     / x          RADIOLOGY/EKG:    < from: Xray Chest 1 View- PORTABLE-Urgent (04.02.21 @ 14:35) >    IMPRESSION: Negative chest.

## 2021-04-04 NOTE — PROGRESS NOTE ADULT - ASSESSMENT
50 yo M with a PMH atrial fibrillation (on ASA) s/p past ablation and prior LOLA/CV who presents with palpitations found to be in AFib RVR. TTE showing mildly reduced LVSF (likely tachycardia induced).    -Patient converted to NSR. Switched cardizem to 360mg Qday to help with compliance and pill burden  -structure in RA seen on TTE was reviewed by me and it is just a Chiari network which is normal RA structure. no further evaluation or work up needed.  -CHADSVASc 0, on ASA.  -He drinks about 3 cups of coffee/day. Discussed caffeine and etoh cessation.  -Dispo as per primary team. OK to D/C home from cardiac standpoint with close follow-up. patient wants to establish care in [place]. patient was given my card.

## 2021-04-04 NOTE — PROGRESS NOTE ADULT - ASSESSMENT
50 yo M with a PMH atrial fibrillation (on ASA) s/p past cardioversion who presents with SOB, found to be in atrial fibrillation with RVR.    #Atrial fibrillation with RVR: now rate controlled  - History of afib s/p ablation in past ( 2-3 years ago)   - May be provoked by cutting back on alcohol in past week  - In no rate control medications at home   - Given PO/IV Diltiazem in ED with HR improvement and started on Diltiazem 60 mg PO four times daily   - Continue Diltiazem  90 mg PO every 6 hrs   - If not improvement consider start Metoprolol tartrate or Cardizem drip   - ECHO showed LVEF 40% and left ventricle systolic function impaired   - Trop neg x 3   - AEUBB5Ahpy of 0  - Continue ASA   - Possible LOLA/ CV on Monday if continues in Afib RVR   - Cardiology recommendations appreciated     #Cardiomyopathy   - ECHO showed LVEF 40% and left ventricle systolic function impaired and echodense structure is noted in the right atrium   - Could be secondary to prolong uncontrolled Afib( tachycardiac driven)   - Euvolemic   - Pt with a echodense structure in the RA?? could benefit of LOLA to r/o myxoma vs anatomic variant  vs thrombus  - Consider initiation of BB, discuss with cardiology   - Follow up cardiology recommendations      # Rhabdomyolysis  - CK elevated at admission  1273   - Pt endorses consistent strenuous workouts and some myalgias since one week ago   - Renal function stable   - Trend CK   - Continue IVF     # Heavy Alcohol Consumption  - Patient drinks significant amount as noted in social history, cut back one week ago  - Discussed extensively techniques to cut back, patient has already cut back in past week. 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

## 2021-04-04 NOTE — DISCHARGE NOTE PROVIDER - CARE PROVIDER_API CALL
Hetal Linn (DO)  Cardiovascular Disease; Internal Medicine  [address], [address]  [place] [address]  Phone: [phone]  Fax: [phone]  Follow Up Time: 1 week
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
## 2021-04-04 NOTE — DISCHARGE NOTE NURSING/CASE MANAGEMENT/SOCIAL WORK - PATIENT PORTAL LINK FT
You can access the FollowMyHealth Patient Portal offered by [facility] by registering at the following website: [email or web address]. By joining [employer]’s FollowMyHealth portal, you will also be able to view your health information using other applications (apps) compatible with our system.

## 2021-04-04 NOTE — DISCHARGE NOTE PROVIDER - NSDCCPCAREPLAN_GEN_ALL_CORE_FT
PRINCIPAL DISCHARGE DIAGNOSIS  Diagnosis: Atrial fibrillation with RVR  Assessment and Plan of Treatment: You were admitted because you were experiencing palitations due to your heart beating at a faster pace then normal.  During the admission you were diagnosed with AFIB  and subsequently treated with medications to lower your heart rate. Please continue medication for rate control, diltiazem 90mg ,three times a day and continue medication for stroke prevention ,aspirin ,once a day.  Please follow up with your cardiologist, Dr. Linn within a week.      SECONDARY DISCHARGE DIAGNOSES  Diagnosis: PVC (premature ventricular contraction)  Assessment and Plan of Treatment: - see above    Diagnosis: Ventricular tachycardia, nonsustained  Assessment and Plan of Treatment: - See above    Diagnosis: Rhabdomyolysis  Assessment and Plan of Treatment: - Markers in your blood for muscle cell breakdown were elevated secondary to strenous workout. After hydrating you with IV fluids these markers improved. Continue to stay hydrated.

## 2021-04-09 DIAGNOSIS — F17.210 NICOTINE DEPENDENCE, CIGARETTES, UNCOMPLICATED: ICD-10-CM

## 2021-04-09 DIAGNOSIS — F10.10 ALCOHOL ABUSE, UNCOMPLICATED: ICD-10-CM

## 2021-04-09 DIAGNOSIS — M62.82 RHABDOMYOLYSIS: ICD-10-CM

## 2021-04-09 DIAGNOSIS — Z79.82 LONG TERM (CURRENT) USE OF ASPIRIN: ICD-10-CM

## 2021-04-09 DIAGNOSIS — I49.3 VENTRICULAR PREMATURE DEPOLARIZATION: ICD-10-CM

## 2021-04-09 DIAGNOSIS — E78.5 HYPERLIPIDEMIA, UNSPECIFIED: ICD-10-CM

## 2021-04-09 DIAGNOSIS — I48.91 UNSPECIFIED ATRIAL FIBRILLATION: ICD-10-CM

## 2021-04-09 DIAGNOSIS — I47.2 VENTRICULAR TACHYCARDIA: ICD-10-CM

## 2021-04-09 DIAGNOSIS — I10 ESSENTIAL (PRIMARY) HYPERTENSION: ICD-10-CM

## 2021-04-25 ENCOUNTER — RX RENEWAL (OUTPATIENT)
Age: 51
End: 2021-04-25

## 2021-04-25 PROBLEM — Z00.00 ENCOUNTER FOR PREVENTIVE HEALTH EXAMINATION: Status: ACTIVE | Noted: 2021-04-25

## 2021-04-28 PROBLEM — I48.91 UNSPECIFIED ATRIAL FIBRILLATION: Chronic | Status: ACTIVE | Noted: 2021-04-02

## 2021-05-20 ENCOUNTER — NON-APPOINTMENT (OUTPATIENT)
Age: 51
End: 2021-05-20

## 2021-05-20 ENCOUNTER — APPOINTMENT (OUTPATIENT)
Dept: ELECTROPHYSIOLOGY | Facility: CLINIC | Age: 51
End: 2021-05-20
Payer: COMMERCIAL

## 2021-05-20 VITALS
WEIGHT: 211 LBS | RESPIRATION RATE: 14 BRPM | OXYGEN SATURATION: 98 % | HEART RATE: 83 BPM | HEIGHT: 69 IN | SYSTOLIC BLOOD PRESSURE: 139 MMHG | DIASTOLIC BLOOD PRESSURE: 99 MMHG | BODY MASS INDEX: 31.25 KG/M2

## 2021-05-20 DIAGNOSIS — Q24.9 CONGENITAL MALFORMATION OF HEART, UNSPECIFIED: ICD-10-CM

## 2021-05-20 PROCEDURE — 93000 ELECTROCARDIOGRAM COMPLETE: CPT

## 2021-05-20 PROCEDURE — 99072 ADDL SUPL MATRL&STAF TM PHE: CPT

## 2021-05-20 PROCEDURE — 99205 OFFICE O/P NEW HI 60 MIN: CPT

## 2021-05-20 RX ORDER — ASPIRIN 81 MG
81 TABLET, DELAYED RELEASE (ENTERIC COATED) ORAL DAILY
Refills: 3 | Status: ACTIVE | COMMUNITY

## 2021-06-11 PROBLEM — Q24.9 CHIARI NETWORK: Status: ACTIVE | Noted: 2021-06-11

## 2021-06-11 NOTE — HISTORY OF PRESENT ILLNESS
[FreeTextEntry1] : Ms. Gibbons is a 51-year-old man with a past medical history of paroxysmal atrial fibrillation status post catheter ablation who presented April 2, 2021 to [facility] with palpitations He was found to be in atrial fibrillation with a rapid ventricular response treated with IV diltiazem and spontaneously converted to sinus rhythm. Currently, SNEHAL GIBBONS  denies chest pain, chest pressure, shortness of breath, lightheadedness, dizziness, palpitations, syncope, presyncope, orthopnea, PND, or edema.

## 2021-06-11 NOTE — ASSESSMENT
[FreeTextEntry1] : This is a 51 year old man with a history of paroxysmal atrial fibrillation with his first recurrence after ablation. \par \par Recommend a Implantable cardiac monitor/loop recorder for monitoring of AF burden. \par \par Structure seen on Echo in RA. Reviewed Echo images and RA object is consistent with Chiari network.

## 2021-06-11 NOTE — CARDIOLOGY SUMMARY
[de-identified] : 5/20/21 :  Sinus with VE otherwise normal.  [de-identified] : 4/3/21 :   Mild concentric left ventricular hypertrophy is present.  Left ventricle systolic function appears impaired in the presence of a  cardiac arrhythmia. Left ventricle size and structure are within normal  limitations. Visual estimation of left ventricle ejection fraction is 40%.  A false tendon is noted near the apex of the left ventricle.  Redundant cordae is noted.  The left atrium appears normal by LA volume index.  The right atrium appears mildly dilated.  An echodense structure is noted in the right atrium in the apical view and  parasternal short axis view (image 27).  Consider LOLA if clinically indicated  Normal appearing right ventricle structure and function.  The aortic valve is trileaflet with thin pliable leaflets. Trace aortic regurgitation is present.  The mitral valve leaflets appear thin and normal.  Mild (1+) mitral regurgitation is present.  The tricuspid valve leaflets are thin and pliable; valve motion is normal.\par  Mild to Moderate Tricuspid regurgitation is present. Mild pulmonic valvular regurgitation (1+) is present.  No evidence of pericardial effusion.  No evidence of pleural effusion.  The IVC appears normal.

## 2021-06-18 DIAGNOSIS — Z01.818 ENCOUNTER FOR OTHER PREPROCEDURAL EXAMINATION: ICD-10-CM

## 2021-06-19 ENCOUNTER — APPOINTMENT (OUTPATIENT)
Dept: DISASTER EMERGENCY | Facility: CLINIC | Age: 51
End: 2021-06-19

## 2021-06-20 LAB — SARS-COV-2 N GENE NPH QL NAA+PROBE: NOT DETECTED

## 2021-06-21 RX ORDER — ASCORBIC ACID 60 MG
1 TABLET,CHEWABLE ORAL
Qty: 0 | Refills: 0 | DISCHARGE

## 2021-06-21 RX ORDER — VITAMIN E 100 UNIT
1 CAPSULE ORAL
Qty: 0 | Refills: 0 | DISCHARGE

## 2021-06-22 ENCOUNTER — OUTPATIENT (OUTPATIENT)
Dept: OUTPATIENT SERVICES | Facility: HOSPITAL | Age: 51
LOS: 1 days | Discharge: HOME | End: 2021-06-22
Payer: COMMERCIAL

## 2021-06-22 VITALS
WEIGHT: 205.03 LBS | DIASTOLIC BLOOD PRESSURE: 79 MMHG | SYSTOLIC BLOOD PRESSURE: 139 MMHG | RESPIRATION RATE: 18 BRPM | TEMPERATURE: 97 F | OXYGEN SATURATION: 100 % | HEIGHT: 69 IN | HEART RATE: 71 BPM

## 2021-06-22 VITALS
OXYGEN SATURATION: 100 % | DIASTOLIC BLOOD PRESSURE: 89 MMHG | HEART RATE: 55 BPM | SYSTOLIC BLOOD PRESSURE: 134 MMHG | RESPIRATION RATE: 18 BRPM

## 2021-06-22 DIAGNOSIS — Z79.82 LONG TERM (CURRENT) USE OF ASPIRIN: ICD-10-CM

## 2021-06-22 DIAGNOSIS — I48.0 PAROXYSMAL ATRIAL FIBRILLATION: ICD-10-CM

## 2021-06-22 DIAGNOSIS — I48.91 UNSPECIFIED ATRIAL FIBRILLATION: ICD-10-CM

## 2021-06-22 DIAGNOSIS — Z98.890 OTHER SPECIFIED POSTPROCEDURAL STATES: Chronic | ICD-10-CM

## 2021-06-22 DIAGNOSIS — Z93.3 COLOSTOMY STATUS: Chronic | ICD-10-CM

## 2021-06-22 DIAGNOSIS — R00.2 PALPITATIONS: ICD-10-CM

## 2021-06-22 DIAGNOSIS — Z82.49 FAMILY HISTORY OF ISCHEMIC HEART DISEASE AND OTHER DISEASES OF THE CIRCULATORY SYSTEM: ICD-10-CM

## 2021-06-22 DIAGNOSIS — Z83.3 FAMILY HISTORY OF DIABETES MELLITUS: ICD-10-CM

## 2021-06-22 PROCEDURE — 33285 INSJ SUBQ CAR RHYTHM MNTR: CPT

## 2021-06-22 RX ORDER — ASPIRIN/CALCIUM CARB/MAGNESIUM 324 MG
1 TABLET ORAL
Qty: 0 | Refills: 0 | DISCHARGE

## 2021-06-22 NOTE — PACU DISCHARGE NOTE - COMMENTS
pt discharged home, discharge instructions given to the patient, pt verbalized understanding, safety maintained.

## 2021-06-22 NOTE — PROCEDURAL SAFETY CHECKLIST WITH OR WITHOUT SEDATION - NSPOSTCOMMENTFT_GEN_ALL_CORE
pt prepped for the procedure sa per protocol, all team members present and identified, brif and time out completed, DR Malave proceeds with loop implant, incision closed with suture and Dermabond, wound class 1, safety maintained.

## 2021-07-19 ENCOUNTER — TRANSCRIPTION ENCOUNTER (OUTPATIENT)
Age: 51
End: 2021-07-19

## 2021-07-19 ENCOUNTER — APPOINTMENT (OUTPATIENT)
Dept: ELECTROPHYSIOLOGY | Facility: CLINIC | Age: 51
End: 2021-07-19
Payer: COMMERCIAL

## 2021-07-19 VITALS
DIASTOLIC BLOOD PRESSURE: 94 MMHG | HEART RATE: 75 BPM | BODY MASS INDEX: 31.25 KG/M2 | HEIGHT: 69 IN | SYSTOLIC BLOOD PRESSURE: 136 MMHG | RESPIRATION RATE: 14 BRPM | WEIGHT: 211 LBS | OXYGEN SATURATION: 99 %

## 2021-07-19 PROCEDURE — 99072 ADDL SUPL MATRL&STAF TM PHE: CPT

## 2021-07-19 PROCEDURE — 99211 OFF/OP EST MAY X REQ PHY/QHP: CPT

## 2021-07-19 PROCEDURE — 93285 PRGRMG DEV EVAL SCRMS IP: CPT

## 2022-01-10 ENCOUNTER — APPOINTMENT (OUTPATIENT)
Dept: ELECTROPHYSIOLOGY | Facility: CLINIC | Age: 52
End: 2022-01-10
Payer: COMMERCIAL

## 2022-01-10 VITALS
OXYGEN SATURATION: 98 % | BODY MASS INDEX: 31.25 KG/M2 | HEIGHT: 69 IN | HEART RATE: 79 BPM | SYSTOLIC BLOOD PRESSURE: 133 MMHG | DIASTOLIC BLOOD PRESSURE: 88 MMHG | WEIGHT: 211 LBS

## 2022-01-10 PROCEDURE — 93285 PRGRMG DEV EVAL SCRMS IP: CPT

## 2022-01-10 PROCEDURE — 99211 OFF/OP EST MAY X REQ PHY/QHP: CPT

## 2022-01-10 RX ORDER — DILTIAZEM HYDROCHLORIDE 360 MG/1
360 CAPSULE, COATED, EXTENDED RELEASE ORAL
Refills: 0 | Status: DISCONTINUED | COMMUNITY
End: 2022-01-10

## 2022-01-10 RX ORDER — FOLIC ACID 1 MG/1
1 TABLET ORAL
Qty: 30 | Refills: 2 | Status: DISCONTINUED | COMMUNITY
Start: 2021-04-25 | End: 2022-01-10

## 2022-01-10 NOTE — ASSESSMENT
[FreeTextEntry1] : 51 year old male presents to ILR interrogation.\par Hx of At Fib with RVR\par Indication is PAF.  Pt is in SR\par Interrogations shows no events.\par Pt continue to avoid Red Bull and other caffeinated drinks.\par He denies any symptoms of palpitations.\par \par Pt will f/u with remote checks with Dr. Linn. \par He will schedule repeat office visit here in EP office for 6 months

## 2022-07-25 ENCOUNTER — APPOINTMENT (OUTPATIENT)
Dept: ELECTROPHYSIOLOGY | Facility: CLINIC | Age: 52
End: 2022-07-25

## 2022-09-13 ENCOUNTER — NON-APPOINTMENT (OUTPATIENT)
Age: 52
End: 2022-09-13

## 2022-09-13 ENCOUNTER — APPOINTMENT (OUTPATIENT)
Dept: ELECTROPHYSIOLOGY | Facility: CLINIC | Age: 52
End: 2022-09-13

## 2022-09-13 VITALS
OXYGEN SATURATION: 99 % | DIASTOLIC BLOOD PRESSURE: 95 MMHG | HEART RATE: 68 BPM | SYSTOLIC BLOOD PRESSURE: 142 MMHG | HEIGHT: 69 IN

## 2022-09-13 PROCEDURE — 99213 OFFICE O/P EST LOW 20 MIN: CPT | Mod: 25

## 2022-09-13 PROCEDURE — 93000 ELECTROCARDIOGRAM COMPLETE: CPT | Mod: 59

## 2022-09-13 PROCEDURE — 93291 INTERROG DEV EVAL SCRMS IP: CPT

## 2022-10-26 ENCOUNTER — EMERGENCY (EMERGENCY)
Facility: HOSPITAL | Age: 52
LOS: 0 days | Discharge: HOME | End: 2022-10-26
Attending: EMERGENCY MEDICINE
Payer: COMMERCIAL

## 2022-10-26 VITALS — WEIGHT: 205.03 LBS | HEIGHT: 69 IN

## 2022-10-26 VITALS
TEMPERATURE: 98 F | DIASTOLIC BLOOD PRESSURE: 96 MMHG | OXYGEN SATURATION: 99 % | RESPIRATION RATE: 18 BRPM | HEART RATE: 72 BPM | SYSTOLIC BLOOD PRESSURE: 139 MMHG
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
## 2022-10-26 DIAGNOSIS — M25.461 EFFUSION, RIGHT KNEE: ICD-10-CM

## 2022-10-26 DIAGNOSIS — I48.91 UNSPECIFIED ATRIAL FIBRILLATION: ICD-10-CM

## 2022-10-26 DIAGNOSIS — M25.561 PAIN IN RIGHT KNEE: ICD-10-CM

## 2022-10-26 DIAGNOSIS — Z79.82 LONG TERM (CURRENT) USE OF ASPIRIN: ICD-10-CM
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
## 2022-10-26 DIAGNOSIS — M25.521 PAIN IN RIGHT ELBOW: ICD-10-CM

## 2022-10-26 DIAGNOSIS — Z98.890 OTHER SPECIFIED POSTPROCEDURAL STATES: Chronic | ICD-10-CM
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
## 2022-10-26 DIAGNOSIS — Z93.3 COLOSTOMY STATUS: Chronic | ICD-10-CM

## 2022-10-26 PROCEDURE — 73562 X-RAY EXAM OF KNEE 3: CPT | Mod: 50

## 2022-10-26 PROCEDURE — 99284 EMERGENCY DEPT VISIT MOD MDM: CPT | Mod: 25

## 2022-10-26 PROCEDURE — 73080 X-RAY EXAM OF ELBOW: CPT | Mod: RT

## 2022-10-26 PROCEDURE — 73080 X-RAY EXAM OF ELBOW: CPT | Mod: 26,RT

## 2022-10-26 PROCEDURE — 99284 EMERGENCY DEPT VISIT MOD MDM: CPT

## 2022-10-26 PROCEDURE — 73562 X-RAY EXAM OF KNEE 3: CPT | Mod: 26,50

## 2022-10-26 RX ORDER — ACETAMINOPHEN 500 MG
1000 TABLET ORAL ONCE
Refills: 0 | Status: COMPLETED | OUTPATIENT
Start: 2022-10-26 | End: 2022-10-26

## 2022-10-26 RX ORDER — DEXAMETHASONE 0.5 MG/5ML
10 ELIXIR ORAL ONCE
Refills: 0 | Status: COMPLETED | OUTPATIENT
Start: 2022-10-26 | End: 2022-10-26

## 2022-10-26 RX ORDER — OXYCODONE AND ACETAMINOPHEN 5; 325 MG/1; MG/1
1 TABLET ORAL
Qty: 16 | Refills: 0
Start: 2022-10-26 | End: 2022-10-29

## 2022-10-26 RX ORDER — IBUPROFEN 200 MG
1 TABLET ORAL
Qty: 16 | Refills: 0
Start: 2022-10-26 | End: 2022-10-29

## 2022-10-26 RX ADMIN — Medication 10 MILLIGRAM(S): at 21:46

## 2022-10-26 RX ADMIN — Medication 1000 MILLIGRAM(S): at 21:46

## 2022-10-26 NOTE — ED ADULT NURSE NOTE - NS PRO AD PATIENT TYPE
Pt would like to add secondary HCP: Iselashawna Matthews  [phone] (wife)/Health Care Proxy (HCP)

## 2022-10-26 NOTE — ED ADULT TRIAGE NOTE - CHIEF COMPLAINT QUOTE
Patient presents to the ED s/p fall in the morning. 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 c/o b/l knee and right elbow pain. Denies any other complaints at this time.

## 2022-10-26 NOTE — ED STATDOCS - NSFOLLOWUPINSTRUCTIONS_ED_ALL_ED_FT
Knee Effusion      Knee effusion refers to excess fluid in the knee joint. This can cause pain and swelling in your knee. Knee effusion creates more pressure than usual in your knee joint. This makes it more difficult for you to bend and move your knee. If there is fluid in your knee, it often means that something is wrong inside your knee. This can be a result of:  •Severe arthritis.       •Injury to the knee muscles or to tissues in the knee (ligaments or cartilage).      •Infection.       •Autoimmune disease. This means that your body's defense system (immune system) mistakenly attacks healthy body tissues.        Follow these instructions at home:    If you have a brace or an immobilizer:     •Wear it as told by your health care provider. Remove it only as told by your health care provider.      •Check the skin around it every day. Tell your health care provider about any concerns.      •Loosen it if your toes tingle, become numb, or turn cold and blue.      •Keep it clean.    •If the brace or immobilizer is not waterproof:  •Do not let it get wet.      •Cover it with a watertight covering when you take a bath or shower.          Managing pain, stiffness, and swelling    •If directed, put ice on the affected area. To do this:  •If you have a removable brace or immobilizer, remove it as told by your health care provider.      •Put ice in a plastic bag.      •Place a towel between your skin and the bag.      •Leave the ice on for 20 minutes, 2–3 times a day.      •Remove the ice if your skin turns bright red. This is very important. If you cannot feel pain, heat, or cold, you have a greater risk of damage to the area.        •Move your toes often to reduce stiffness and swelling.      •Raise (elevate) your knee above the level of your heart while you are sitting or lying down.       Activity     •Rest as told by your health care provider.       • Do not use the injured limb to support your body weight until your health care provider says that you can. Use crutches as told by your health care provider.      •Do exercises as told by your health care provider.      General instructions     •Take over-the-counter and prescription medicines only as told by your health care provider.      • Do not use any products that contain nicotine or tobacco. These products include cigarettes, chewing tobacco, and vaping devices, such as e-cigarettes. If you need help quitting, ask your health care provider.      •Wear an elastic bandage or a wrap that puts pressure on your knee (compression wrap) as told by your health care provider.      •Keep all follow-up visits. This is important.        Contact a health care provider if:    •You continue to have pain in your knee.      •You have a fever or chills.        Get help right away if:    •You have swelling or redness in your knee that gets worse or does not get better.      •You have severe pain in your knee.        Summary    •Knee effusion refers to excess fluid in the knee joint. This causes pain and swelling and makes it difficult to bend and move your knee.      •Effusion may be caused by severe arthritis, autoimmune disease, infection, or injury to the knee muscles or to tissues in the knee (ligaments or cartilage).      •Take over-the-counter and prescription medicines only as told by your health care provider.      •If you have a brace or an immobilizer, wear it as told by your health care provider.      This information is not intended to replace advice given to you by your health care provider. Make sure you discuss any questions you have with your health care provider.

## 2022-10-26 NOTE — ED STATDOCS - ATTENDING APP SHARED VISIT CONTRIBUTION OF CARE
Attending Contribution to Care: I, Chana Conklin, performed the initial face to face bedside interview with this patient regarding history of present illness, review of symptoms and relevant past medical, social and family history.  I completed an independent physical examination.  I was the initial provider who evaluated this patient. I have signed out the follow up of any pending tests (i.e. labs, radiological studies) to the ACP.  I have communicated the patient’s plan of care and disposition with the ACP.

## 2022-10-26 NOTE — ED STATDOCS - PROGRESS NOTE DETAILS
Patient seen and evaluated, ED attending note and orders reviewed, will continue with patient follow up and care -Inez Soares PA-C XRs negative, pt's knee wrapped with ace, given cane, pt is able to ambulate, advised shelley GILL f/u  Inez Soares PA-C

## 2022-10-26 NOTE — ED STATDOCS - PATIENT PORTAL LINK FT
You can access the FollowMyHealth Patient Portal offered by [facility] by registering at the following website: [email or web address]. By joining [employer]’s FollowMyHealth portal, you will also be able to view your health information using other applications (apps) compatible with our system.

## 2022-10-26 NOTE — ED STATDOCS - CARE PROVIDER_API CALL
Yobani Redman (MD)  Orthopaedic Surgery; Surgery of the Hand  [address], [address]  [place] [address]  Phone: [phone]  Fax: [phone]  Follow Up Time:     Vahid Mcmahon)  Orthopaedic Surgery  [address], [place] [id number]  Phone: [phone]  Fax: [phone]  Follow Up Time:

## 2022-10-26 NOTE — ED STATDOCS - CLINICAL SUMMARY MEDICAL DECISION MAKING FREE TEXT BOX
Mechanical fall with contusion of knees and elbow.  Ace bandage to right knee.  Rest, ice, elevate and PMD or ortho follow up advised.

## 2022-10-26 NOTE — ED STATDOCS - MUSCULOSKELETAL, MLM
+light effusion of left knee bilateral knee tenderness, skin intact no abrasion. tenderness of the lateral epicondyle of the R elbow, full ROM of upper and lower extremities and NVI +slight effusion of right knee;+ bilateral knee tenderness, skin intact no abrasion. tenderness of the lateral epicondyle of the R elbow, full ROM of upper and lower extremities and NVI
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
## 2022-10-26 NOTE — ED STATDOCS - NS ED ATTENDING STATEMENT MOD
This was a shared visit with the [facility]. I reviewed and verified the documentation and independently performed the documented:

## 2022-11-02 NOTE — CARDIOLOGY SUMMARY
[de-identified] : 5/20/21 :  Sinus with VE otherwise normal.  [de-identified] : 9/13/22 : ILR/ICM interrogation.  No recent atrial fibrillation  [de-identified] : 4/3/21 :   Mild concentric left ventricular hypertrophy is present.  Left ventricle systolic function appears impaired in the presence of a  cardiac arrhythmia. Left ventricle size and structure are within normal  limitations. Visual estimation of left ventricle ejection fraction is 40%.  A false tendon is noted near the apex of the left ventricle.  Redundant cordae is noted.  The left atrium appears normal by LA volume index.  The right atrium appears mildly dilated.  An echodense structure is noted in the right atrium in the apical view and  parasternal short axis view (image 27).  Consider LOLA if clinically indicated  Normal appearing right ventricle structure and function.  The aortic valve is trileaflet with thin pliable leaflets. Trace aortic regurgitation is present.  The mitral valve leaflets appear thin and normal.  Mild (1+) mitral regurgitation is present.  The tricuspid valve leaflets are thin and pliable; valve motion is normal.\par  Mild to Moderate Tricuspid regurgitation is present. Mild pulmonic valvular regurgitation (1+) is present.  No evidence of pericardial effusion.  No evidence of pleural effusion.  The IVC appears normal.

## 2022-11-02 NOTE — HISTORY OF PRESENT ILLNESS
[FreeTextEntry1] : Ms. Gibbons is a 52-year-old man with a past medical history of paroxysmal atrial fibrillation status post catheter ablation who presented April 2, 2021 to [facility] with palpitations He was found to be in atrial fibrillation with a rapid ventricular response treated with IV diltiazem and spontaneously converted to sinus rhythm. He had an ICM/ILR implanted 6/22/21. Currently, SNEHAL GIBBONS  denies chest pain, chest pressure, shortness of breath, lightheadedness, dizziness, palpitations, syncope, presyncope, orthopnea, PND, or edema.

## 2022-11-02 NOTE — ASSESSMENT
[FreeTextEntry1] : This is a 52 year old man with a history of paroxysmal atrial fibrillation with his first recurrence after ablation. \par \par No recent events CHADSVASc score 0. \par Continue ASA 81 mg. \par Monthly monitoring with Dr. Sutton.

## 2023-04-06 ENCOUNTER — NON-APPOINTMENT (OUTPATIENT)
Age: 53
End: 2023-04-06

## 2023-04-06 ENCOUNTER — APPOINTMENT (OUTPATIENT)
Dept: ELECTROPHYSIOLOGY | Facility: CLINIC | Age: 53
End: 2023-04-06
Payer: COMMERCIAL

## 2023-04-06 VITALS
OXYGEN SATURATION: 96 % | DIASTOLIC BLOOD PRESSURE: 88 MMHG | HEIGHT: 69 IN | WEIGHT: 205 LBS | HEART RATE: 95 BPM | BODY MASS INDEX: 30.36 KG/M2 | SYSTOLIC BLOOD PRESSURE: 130 MMHG

## 2023-04-06 DIAGNOSIS — I48.0 PAROXYSMAL ATRIAL FIBRILLATION: ICD-10-CM

## 2023-04-06 DIAGNOSIS — Z98.890 OTHER SPECIFIED POSTPROCEDURAL STATES: ICD-10-CM

## 2023-04-06 PROCEDURE — 99214 OFFICE O/P EST MOD 30 MIN: CPT

## 2023-04-06 RX ORDER — THIAMINE HCL 100 MG
TABLET ORAL DAILY
Refills: 0 | Status: DISCONTINUED | COMMUNITY
End: 2023-04-06

## 2023-05-30 PROBLEM — Z98.890 HISTORY OF LOOP RECORDER: Status: ACTIVE | Noted: 2021-07-19

## 2023-05-30 PROBLEM — I48.0 PAROXYSMAL ATRIAL FIBRILLATION: Status: ACTIVE | Noted: 2021-05-20

## 2023-05-30 NOTE — HISTORY OF PRESENT ILLNESS
[FreeTextEntry1] : Ms. Gibbons is a 53-year-old man with a past medical history of paroxysmal atrial fibrillation status post catheter ablation who presented April 2, 2021 to [facility] with palpitations He was found to be in atrial fibrillation with a rapid ventricular response treated with IV diltiazem and spontaneously converted to sinus rhythm. He had an ICM/ILR implanted 6/22/21.  loop recorder followed by Dr. Sutton for remotes.  No reported arrhythmia. Currently, SNEHAL GIBBONS  denies chest pain, chest pressure, shortness of breath, lightheadedness, dizziness, palpitations, syncope, presyncope, orthopnea, PND, or edema.

## 2023-05-30 NOTE — CARDIOLOGY SUMMARY
[de-identified] : 5/20/21 :  Sinus with VE otherwise normal.  [de-identified] : 9/13/22 : ILR/ICM interrogation.  No recent atrial fibrillation  [de-identified] : 4/3/21 :   Mild concentric left ventricular hypertrophy is present.  Left ventricle systolic function appears impaired in the presence of a  cardiac arrhythmia. Left ventricle size and structure are within normal  limitations. Visual estimation of left ventricle ejection fraction is 40%.  A false tendon is noted near the apex of the left ventricle.  Redundant cordae is noted.  The left atrium appears normal by LA volume index.  The right atrium appears mildly dilated.  An echodense structure is noted in the right atrium in the apical view and  parasternal short axis view (image 27).  Consider LOLA if clinically indicated  Normal appearing right ventricle structure and function.  The aortic valve is trileaflet with thin pliable leaflets. Trace aortic regurgitation is present.  The mitral valve leaflets appear thin and normal.  Mild (1+) mitral regurgitation is present.  The tricuspid valve leaflets are thin and pliable; valve motion is normal.\par  Mild to Moderate Tricuspid regurgitation is present. Mild pulmonic valvular regurgitation (1+) is present.  No evidence of pericardial effusion.  No evidence of pleural effusion.  The IVC appears normal.

## 2023-05-30 NOTE — ASSESSMENT
[FreeTextEntry1] : This is a 53 year old man with a history of paroxysmal atrial fibrillation with his first recurrence after ablation. \par \par No recent events CHADSVASc score 0. \par Continue ASA 81 mg. \par Monthly monitoring with Dr. Sutton.

## 2023-10-04 ENCOUNTER — APPOINTMENT (OUTPATIENT)
Dept: ELECTROPHYSIOLOGY | Facility: CLINIC | Age: 53
End: 2023-10-04

## 2024-05-19 ENCOUNTER — EMERGENCY (EMERGENCY)
Facility: HOSPITAL | Age: 54
LOS: 0 days | Discharge: HOME | End: 2024-05-19
Attending: EMERGENCY MEDICINE
Payer: COMMERCIAL

## 2024-05-19 VITALS
TEMPERATURE: 97 F | SYSTOLIC BLOOD PRESSURE: 140 MMHG | HEART RATE: 80 BPM | RESPIRATION RATE: 16 BRPM | DIASTOLIC BLOOD PRESSURE: 97 MMHG | OXYGEN SATURATION: 99 %

## 2024-05-19 DIAGNOSIS — R42 DIZZINESS AND GIDDINESS: ICD-10-CM

## 2024-05-19 DIAGNOSIS — R79.1 ABNORMAL COAGULATION PROFILE: ICD-10-CM

## 2024-05-19 DIAGNOSIS — I10 ESSENTIAL (PRIMARY) HYPERTENSION: ICD-10-CM

## 2024-05-19 DIAGNOSIS — I48.91 UNSPECIFIED ATRIAL FIBRILLATION: ICD-10-CM

## 2024-05-19 DIAGNOSIS — Z98.890 OTHER SPECIFIED POSTPROCEDURAL STATES: Chronic | ICD-10-CM

## 2024-05-19 DIAGNOSIS — Z93.3 COLOSTOMY STATUS: Chronic | ICD-10-CM

## 2024-05-19 DIAGNOSIS — Z79.82 LONG TERM (CURRENT) USE OF ASPIRIN: ICD-10-CM

## 2024-05-19 LAB
ALBUMIN SERPL ELPH-MCNC: 3.7 G/DL — SIGNIFICANT CHANGE UP (ref 3.3–5)
ALP SERPL-CCNC: 101 U/L — SIGNIFICANT CHANGE UP (ref 40–120)
ALT FLD-CCNC: 28 U/L — SIGNIFICANT CHANGE UP (ref 12–78)
ANION GAP SERPL CALC-SCNC: 3 MMOL/L — LOW (ref 5–17)
APTT BLD: 31.8 SEC — SIGNIFICANT CHANGE UP (ref 24.5–35.6)
AST SERPL-CCNC: 19 U/L — SIGNIFICANT CHANGE UP (ref 15–37)
BASOPHILS # BLD AUTO: 0.03 K/UL — SIGNIFICANT CHANGE UP (ref 0–0.2)
BASOPHILS NFR BLD AUTO: 0.6 % — SIGNIFICANT CHANGE UP (ref 0–2)
BILIRUB SERPL-MCNC: 0.6 MG/DL — SIGNIFICANT CHANGE UP (ref 0.2–1.2)
BUN SERPL-MCNC: 11 MG/DL — SIGNIFICANT CHANGE UP (ref 7–23)
CALCIUM SERPL-MCNC: 9.4 MG/DL — SIGNIFICANT CHANGE UP (ref 8.5–10.1)
CHLORIDE SERPL-SCNC: 107 MMOL/L — SIGNIFICANT CHANGE UP (ref 96–108)
CO2 SERPL-SCNC: 27 MMOL/L — SIGNIFICANT CHANGE UP (ref 22–31)
CREAT SERPL-MCNC: 1.18 MG/DL — SIGNIFICANT CHANGE UP (ref 0.5–1.3)
D DIMER BLD IA.RAPID-MCNC: 280 NG/ML DDU — HIGH
EGFR: 72 ML/MIN/1.73M2 — SIGNIFICANT CHANGE UP
EOSINOPHIL # BLD AUTO: 0.18 K/UL — SIGNIFICANT CHANGE UP (ref 0–0.5)
EOSINOPHIL NFR BLD AUTO: 3.3 % — SIGNIFICANT CHANGE UP (ref 0–6)
GLUCOSE SERPL-MCNC: 101 MG/DL — HIGH (ref 70–99)
HCT VFR BLD CALC: 52.3 % — HIGH (ref 39–50)
HGB BLD-MCNC: 17.7 G/DL — HIGH (ref 13–17)
IMM GRANULOCYTES NFR BLD AUTO: 0.2 % — SIGNIFICANT CHANGE UP (ref 0–0.9)
INR BLD: 0.89 RATIO — SIGNIFICANT CHANGE UP (ref 0.85–1.18)
LYMPHOCYTES # BLD AUTO: 1.57 K/UL — SIGNIFICANT CHANGE UP (ref 1–3.3)
LYMPHOCYTES # BLD AUTO: 28.9 % — SIGNIFICANT CHANGE UP (ref 13–44)
MAGNESIUM SERPL-MCNC: 2 MG/DL — SIGNIFICANT CHANGE UP (ref 1.6–2.6)
MCHC RBC-ENTMCNC: 30.3 PG — SIGNIFICANT CHANGE UP (ref 27–34)
MCHC RBC-ENTMCNC: 33.8 GM/DL — SIGNIFICANT CHANGE UP (ref 32–36)
MCV RBC AUTO: 89.6 FL — SIGNIFICANT CHANGE UP (ref 80–100)
MONOCYTES # BLD AUTO: 0.45 K/UL — SIGNIFICANT CHANGE UP (ref 0–0.9)
MONOCYTES NFR BLD AUTO: 8.3 % — SIGNIFICANT CHANGE UP (ref 2–14)
NEUTROPHILS # BLD AUTO: 3.19 K/UL — SIGNIFICANT CHANGE UP (ref 1.8–7.4)
NEUTROPHILS NFR BLD AUTO: 58.7 % — SIGNIFICANT CHANGE UP (ref 43–77)
NT-PROBNP SERPL-SCNC: 17 PG/ML — SIGNIFICANT CHANGE UP (ref 0–125)
PLATELET # BLD AUTO: 240 K/UL — SIGNIFICANT CHANGE UP (ref 150–400)
POTASSIUM SERPL-MCNC: 4 MMOL/L — SIGNIFICANT CHANGE UP (ref 3.5–5.3)
POTASSIUM SERPL-SCNC: 4 MMOL/L — SIGNIFICANT CHANGE UP (ref 3.5–5.3)
PROT SERPL-MCNC: 7.1 GM/DL — SIGNIFICANT CHANGE UP (ref 6–8.3)
PROTHROM AB SERPL-ACNC: 10.1 SEC — SIGNIFICANT CHANGE UP (ref 9.5–13)
RBC # BLD: 5.84 M/UL — HIGH (ref 4.2–5.8)
RBC # FLD: 13.2 % — SIGNIFICANT CHANGE UP (ref 10.3–14.5)
SODIUM SERPL-SCNC: 137 MMOL/L — SIGNIFICANT CHANGE UP (ref 135–145)
TROPONIN I, HIGH SENSITIVITY RESULT: 9.21 NG/L — SIGNIFICANT CHANGE UP
WBC # BLD: 5.43 K/UL — SIGNIFICANT CHANGE UP (ref 3.8–10.5)
WBC # FLD AUTO: 5.43 K/UL — SIGNIFICANT CHANGE UP (ref 3.8–10.5)

## 2024-05-19 PROCEDURE — 85379 FIBRIN DEGRADATION QUANT: CPT

## 2024-05-19 PROCEDURE — 85025 COMPLETE CBC W/AUTO DIFF WBC: CPT

## 2024-05-19 PROCEDURE — 36415 COLL VENOUS BLD VENIPUNCTURE: CPT

## 2024-05-19 PROCEDURE — 99285 EMERGENCY DEPT VISIT HI MDM: CPT | Mod: 25

## 2024-05-19 PROCEDURE — 85730 THROMBOPLASTIN TIME PARTIAL: CPT

## 2024-05-19 PROCEDURE — 70450 CT HEAD/BRAIN W/O DYE: CPT | Mod: MC

## 2024-05-19 PROCEDURE — 71275 CT ANGIOGRAPHY CHEST: CPT | Mod: MC

## 2024-05-19 PROCEDURE — 93005 ELECTROCARDIOGRAM TRACING: CPT

## 2024-05-19 PROCEDURE — 84484 ASSAY OF TROPONIN QUANT: CPT

## 2024-05-19 PROCEDURE — 99285 EMERGENCY DEPT VISIT HI MDM: CPT

## 2024-05-19 PROCEDURE — 36000 PLACE NEEDLE IN VEIN: CPT | Mod: XU

## 2024-05-19 PROCEDURE — 80053 COMPREHEN METABOLIC PANEL: CPT

## 2024-05-19 PROCEDURE — 93010 ELECTROCARDIOGRAM REPORT: CPT

## 2024-05-19 PROCEDURE — 83880 ASSAY OF NATRIURETIC PEPTIDE: CPT

## 2024-05-19 PROCEDURE — 70450 CT HEAD/BRAIN W/O DYE: CPT | Mod: 26,MC

## 2024-05-19 PROCEDURE — 71045 X-RAY EXAM CHEST 1 VIEW: CPT | Mod: 26

## 2024-05-19 PROCEDURE — 71045 X-RAY EXAM CHEST 1 VIEW: CPT

## 2024-05-19 PROCEDURE — 85610 PROTHROMBIN TIME: CPT

## 2024-05-19 PROCEDURE — 71275 CT ANGIOGRAPHY CHEST: CPT | Mod: 26,MC

## 2024-05-19 PROCEDURE — 83735 ASSAY OF MAGNESIUM: CPT

## 2024-05-19 RX ORDER — SODIUM CHLORIDE 9 MG/ML
1000 INJECTION INTRAMUSCULAR; INTRAVENOUS; SUBCUTANEOUS ONCE
Refills: 0 | Status: COMPLETED | OUTPATIENT
Start: 2024-05-19 | End: 2024-05-19

## 2024-05-19 RX ADMIN — SODIUM CHLORIDE 1000 MILLILITER(S): 9 INJECTION INTRAMUSCULAR; INTRAVENOUS; SUBCUTANEOUS at 14:48
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
## 2024-05-19 NOTE — ED ADULT NURSE NOTE - NSFALLUNIVINTERV_ED_ALL_ED
Bed/Stretcher in lowest position, wheels locked, appropriate side rails in place/Call bell, personal items and telephone in reach/Instruct patient to call for assistance before getting out of bed/chair/stretcher/Non-slip footwear applied when patient is off stretcher/[place] to call system/Physically safe environment - no spills, clutter or unnecessary equipment/Purposeful proactive rounding/Room/bathroom lighting operational, light cord in reach

## 2024-05-19 NOTE — ED STATDOCS - PATIENT PORTAL LINK FT
You can access the FollowMyHealth Patient Portal offered by [facility] by registering at the following website: [email or web address]. By joining [facility]’s FollowMyHealth portal, you will also be able to view your health information using other applications (apps) compatible with our system.

## 2024-05-19 NOTE — ED STATDOCS - PROGRESS NOTE DETAILS
53 y/o M with PMH of a-fib not on medication, recent left knee meniscus repair last week on daily 325mg ASA presents with dizziness/lightheadedness with elevated BP (145/88) at home. Symptoms intermittent since 4Am yesterday. Denies fever, chills, cough, CP, SOb. Has been eating and drinking normally. PE: Well appearing. Cardiac: 1s2, RRR. Lungs: CTAB. HEENT: NC/AT. PERRLA, EOMI. Moist oral mucosa. Abdomen: NBS x4 soft, nontender. PV: Well healing arthroscopic surgical sites left knee. No LE edema, calf tenderness. A/P: Dizziness, r/o ACS, plan for labs EKG, CXR, CT head, reassess. - Sukumar Mcdonough PA-C Elevated d-dimer 280. Likely elevated due to recent left meniscus repair. patient made aware, will send for CTA chest to r/o PE. - Sukumar Mcdonough PA-C Labs and imaging reviewed with patient. CTs negative. troponin negative. Advised close PCP/cardiology follow up for further assessment. Pt inquired about antihypertensive meds, advised to record daily BPs and d/w PCP upon next evaluation. - Sukumar Mcdonough PA-C

## 2024-05-19 NOTE — ED ADULT NURSE NOTE - OBJECTIVE STATEMENT
Pt A&O x3 presents to the ed c/o dizziness for 2 days. Pt states "I took my blood pressure at home and it was high for me". On assessment /99. Pt denies nausea, vomiting, diarrhea, fever, chills, decrease of appetite and recent trauma. Pt uses a cane to ambulate at baseline

## 2024-05-19 NOTE — ED ADULT TRIAGE NOTE - CHIEF COMPLAINT QUOTE
PT presents to er with complaints of feeling dizzy, onset 4am yesterday, states he had meniscus surgery last week, on 325asa daily, has a history of a-fibb, denies chest pain, palpitations at this time.

## 2024-05-19 NOTE — ED STATDOCS - PHYSICAL EXAMINATION
Constitutional: NAD AOx3  Eyes: PERRL EOMI  Head: Normocephalic atraumatic  Mouth: MMM  Cardiac: regular rate and rhythm  Resp: Lungs CTAB  GI: Abd s/nd/nt  Neuro: CN2-12 grossly intact, LR x 4  Skin: No visible rashes, incision sites on L knee are clean, dry, and intact

## 2024-05-21 ENCOUNTER — NON-APPOINTMENT (OUTPATIENT)
Age: 54
End: 2024-05-21

## 2024-06-11 ENCOUNTER — APPOINTMENT (OUTPATIENT)
Dept: ELECTROPHYSIOLOGY | Facility: CLINIC | Age: 54
End: 2024-06-11

## 2024-08-14 ENCOUNTER — APPOINTMENT (OUTPATIENT)
Dept: ELECTROPHYSIOLOGY | Facility: CLINIC | Age: 54
End: 2024-08-14

## 2024-08-14 VITALS
OXYGEN SATURATION: 96 % | HEIGHT: 69 IN | WEIGHT: 205 LBS | DIASTOLIC BLOOD PRESSURE: 80 MMHG | HEART RATE: 101 BPM | BODY MASS INDEX: 30.36 KG/M2 | SYSTOLIC BLOOD PRESSURE: 130 MMHG

## 2024-08-14 PROCEDURE — 99211 OFF/OP EST MAY X REQ PHY/QHP: CPT

## 2024-08-14 PROCEDURE — 93285 PRGRMG DEV EVAL SCRMS IP: CPT

## 2024-08-14 NOTE — PROCEDURE
[de-identified] : [employer] Lux ILR, no episodes of tachy or bradyarrhythmia recorded. no afib recorded since implant.

## 2024-08-14 NOTE — HISTORY OF PRESENT ILLNESS
[None] : The patient complains of no symptoms [FreeTextEntry1] : Ms. Gallegos is a 54-year-old man with a past medical history of paroxysmal atrial fibrillation status post catheter ablation who presented April 2, 2021 to [facility] with palpitations. He was found to be in atrial fibrillation with a rapid ventricular response treated with IV diltiazem and spontaneously converted to sinus rhythm. He had an ICM/ILR implanted 6/22/21.  loop recorder followed by Dr. Sutton for remotes.  No reported arrhythmia.
